# Patient Record
Sex: MALE | Race: OTHER | NOT HISPANIC OR LATINO
[De-identification: names, ages, dates, MRNs, and addresses within clinical notes are randomized per-mention and may not be internally consistent; named-entity substitution may affect disease eponyms.]

---

## 2017-10-16 PROBLEM — Z00.00 ENCOUNTER FOR PREVENTIVE HEALTH EXAMINATION: Status: ACTIVE | Noted: 2017-10-16

## 2017-11-09 ENCOUNTER — APPOINTMENT (OUTPATIENT)
Dept: DERMATOLOGY | Facility: CLINIC | Age: 82
End: 2017-11-09

## 2022-02-21 ENCOUNTER — INPATIENT (INPATIENT)
Facility: HOSPITAL | Age: 87
LOS: 4 days | Discharge: HOME | End: 2022-02-26
Attending: NEUROLOGICAL SURGERY | Admitting: NEUROLOGICAL SURGERY
Payer: MEDICARE

## 2022-02-21 VITALS
SYSTOLIC BLOOD PRESSURE: 173 MMHG | DIASTOLIC BLOOD PRESSURE: 98 MMHG | HEART RATE: 89 BPM | TEMPERATURE: 97 F | OXYGEN SATURATION: 100 % | RESPIRATION RATE: 17 BRPM

## 2022-02-21 DIAGNOSIS — S06.5X9A TRAUMATIC SUBDURAL HEMORRHAGE WITH LOSS OF CONSCIOUSNESS OF UNSPECIFIED DURATION, INITIAL ENCOUNTER: ICD-10-CM

## 2022-02-21 LAB
ALBUMIN SERPL ELPH-MCNC: 3.9 G/DL — SIGNIFICANT CHANGE UP (ref 3.3–5)
ALP SERPL-CCNC: 73 U/L — SIGNIFICANT CHANGE UP (ref 40–120)
ALT FLD-CCNC: 10 U/L — SIGNIFICANT CHANGE UP (ref 4–41)
ANION GAP SERPL CALC-SCNC: 10 MMOL/L — SIGNIFICANT CHANGE UP (ref 7–14)
APTT BLD: 29.8 SEC — SIGNIFICANT CHANGE UP (ref 27–36.3)
AST SERPL-CCNC: 11 U/L — SIGNIFICANT CHANGE UP (ref 4–40)
BASOPHILS # BLD AUTO: 0.07 K/UL — SIGNIFICANT CHANGE UP (ref 0–0.2)
BASOPHILS NFR BLD AUTO: 0.9 % — SIGNIFICANT CHANGE UP (ref 0–2)
BILIRUB SERPL-MCNC: 0.2 MG/DL — SIGNIFICANT CHANGE UP (ref 0.2–1.2)
BUN SERPL-MCNC: 33 MG/DL — HIGH (ref 7–23)
CALCIUM SERPL-MCNC: 9 MG/DL — SIGNIFICANT CHANGE UP (ref 8.4–10.5)
CHLORIDE SERPL-SCNC: 108 MMOL/L — HIGH (ref 98–107)
CO2 SERPL-SCNC: 23 MMOL/L — SIGNIFICANT CHANGE UP (ref 22–31)
CREAT SERPL-MCNC: 2.04 MG/DL — HIGH (ref 0.5–1.3)
EOSINOPHIL # BLD AUTO: 0.26 K/UL — SIGNIFICANT CHANGE UP (ref 0–0.5)
EOSINOPHIL NFR BLD AUTO: 3.5 % — SIGNIFICANT CHANGE UP (ref 0–6)
GLUCOSE SERPL-MCNC: 107 MG/DL — HIGH (ref 70–99)
HCT VFR BLD CALC: 36.4 % — LOW (ref 39–50)
HGB BLD-MCNC: 12.2 G/DL — LOW (ref 13–17)
IANC: 3.51 K/UL — SIGNIFICANT CHANGE UP (ref 1.5–8.5)
IMM GRANULOCYTES NFR BLD AUTO: 0.3 % — SIGNIFICANT CHANGE UP (ref 0–1.5)
INR BLD: 1.06 RATIO — SIGNIFICANT CHANGE UP (ref 0.88–1.16)
LYMPHOCYTES # BLD AUTO: 2.77 K/UL — SIGNIFICANT CHANGE UP (ref 1–3.3)
LYMPHOCYTES # BLD AUTO: 37.3 % — SIGNIFICANT CHANGE UP (ref 13–44)
MAGNESIUM SERPL-MCNC: 2 MG/DL — SIGNIFICANT CHANGE UP (ref 1.6–2.6)
MCHC RBC-ENTMCNC: 31.7 PG — SIGNIFICANT CHANGE UP (ref 27–34)
MCHC RBC-ENTMCNC: 33.5 GM/DL — SIGNIFICANT CHANGE UP (ref 32–36)
MCV RBC AUTO: 94.5 FL — SIGNIFICANT CHANGE UP (ref 80–100)
MONOCYTES # BLD AUTO: 0.8 K/UL — SIGNIFICANT CHANGE UP (ref 0–0.9)
MONOCYTES NFR BLD AUTO: 10.8 % — SIGNIFICANT CHANGE UP (ref 2–14)
NEUTROPHILS # BLD AUTO: 3.51 K/UL — SIGNIFICANT CHANGE UP (ref 1.8–7.4)
NEUTROPHILS NFR BLD AUTO: 47.2 % — SIGNIFICANT CHANGE UP (ref 43–77)
NRBC # BLD: 0 /100 WBCS — SIGNIFICANT CHANGE UP
NRBC # FLD: 0 K/UL — SIGNIFICANT CHANGE UP
PHOSPHATE SERPL-MCNC: 3 MG/DL — SIGNIFICANT CHANGE UP (ref 2.5–4.5)
PLATELET # BLD AUTO: 233 K/UL — SIGNIFICANT CHANGE UP (ref 150–400)
POTASSIUM SERPL-MCNC: 4.8 MMOL/L — SIGNIFICANT CHANGE UP (ref 3.5–5.3)
POTASSIUM SERPL-SCNC: 4.8 MMOL/L — SIGNIFICANT CHANGE UP (ref 3.5–5.3)
PROT SERPL-MCNC: 6.6 G/DL — SIGNIFICANT CHANGE UP (ref 6–8.3)
PROTHROM AB SERPL-ACNC: 12.2 SEC — SIGNIFICANT CHANGE UP (ref 10.6–13.6)
RBC # BLD: 3.85 M/UL — LOW (ref 4.2–5.8)
RBC # FLD: 13 % — SIGNIFICANT CHANGE UP (ref 10.3–14.5)
SODIUM SERPL-SCNC: 141 MMOL/L — SIGNIFICANT CHANGE UP (ref 135–145)
TROPONIN T, HIGH SENSITIVITY RESULT: 14 NG/L — SIGNIFICANT CHANGE UP
WBC # BLD: 7.43 K/UL — SIGNIFICANT CHANGE UP (ref 3.8–10.5)
WBC # FLD AUTO: 7.43 K/UL — SIGNIFICANT CHANGE UP (ref 3.8–10.5)

## 2022-02-21 PROCEDURE — 99285 EMERGENCY DEPT VISIT HI MDM: CPT

## 2022-02-21 PROCEDURE — 70450 CT HEAD/BRAIN W/O DYE: CPT | Mod: 26,MA

## 2022-02-21 NOTE — ED PROVIDER NOTE - ATTENDING CONTRIBUTION TO CARE
86M fell face first 1 month ago, was seen at OSH, CT was neg afterwards, then having headache, MRI was done today.   MRI neck chronic findings.  MRI head - Mod large L fronto parietal SDH found 2.4 cm with 0.4cm of shift.  Normal neuro exam at present.  Pt c/o mild HA.  Plan check labs, CTH.  Nsx eval.  Admit.  VS:  unremarkable    GEN - NAD;   well appearing;   A+O x3   HEAD - NC/AT     ENT - PEERL, EOMI, mucous membranes    moist , no discharge      NECK: Neck supple, non-tender without lymphadenopathy, no masses, no JVD  PULM - CTA b/l,  symmetric breath sounds  COR -  normal heart sounds    ABD - , ND, NT, soft,  BACK - no CVA tenderness, nontender spine     EXTREMS - no edema, no deformity, warm and well perfused    SKIN - no rash    or bruising      NEUROLOGIC - alert, face symmetric, speech fluent, sensation nl, motor no focal deficit.

## 2022-02-21 NOTE — ED PROVIDER NOTE - CLINICAL SUMMARY MEDICAL DECISION MAKING FREE TEXT BOX
87 yo male with a PMHx of HTN, HLD, Stage 0 prostate cancer, BCC, SCC presenting from outpatient neurologist Dr. Morgan for SDH on MR brain s/p fall w/ head trauma 1 month prior. NSx consulted. Patient w/o neurological deficits. Check CT head. MRI to be uploaded to PACS once we have someone to deliver CD>

## 2022-02-21 NOTE — ED ADULT TRIAGE NOTE - CHIEF COMPLAINT QUOTE
Pt sent from out pt MRI after imaging showed clot in brain. Pt c/o neck pain. Pt states he had fall in January and MRI was follow up. PMH HTN, Squamous cell CA, Prostate CA

## 2022-02-21 NOTE — H&P ADULT - HISTORY OF PRESENT ILLNESS
86M pmhx htn, hld, prostate Ca, BCC, SCC follows with Oncologist at [facility] Dr. Dobbins per daughter was last seen by oncologist last year and is in remission and was advised to f/u in 1 year. Patient fell 1 month ago onto concrete was taken to [facility] at that time and dc home. He has been having occipital Ha x 3 weeks and an oupt mri head and C/s  was done by neurologist Dr. Clayton today. The mri showed a large subacute on chronic SDH left side with shift and patient was advised to come to ED. Patient denies any weakness, nausea or vomiting reports Ashraf is constant 7/10.

## 2022-02-21 NOTE — CHART NOTE - NSCHARTNOTEFT_GEN_A_CORE
IMPROVE VTE Individual Risk Assessment    RISK                                                                Points    [  ] Previous VTE                                                  3    [  ] Thrombophilia                                               2    [  ] Lower limb paralysis                                      2        (unable to hold up >15 seconds)      [ x ] Current Cancer                                              2         (within 6 months)    [  ] Immobilization > 24 hrs                                1    [x  ] ICU/CCU stay > 24 hours                              1    [ x ] Age > 60                                                      1    IMPROVE VTE Score _4________    IMPROVE Score 0-1: Low Risk, No VTE prophylaxis required for most patients, encourage ambulation.   IMPROVE Score 2-3: At risk, pharmacologic VTE prophylaxis is indicated for most patients (in the absence of a contraindication)  IMPROVE Score > or = 4: High Risk, pharmacologic VTE prophylaxis is indicated for most patients (in the absence of a contraindication)

## 2022-02-21 NOTE — H&P ADULT - ASSESSMENT
86M with Left SDH 2.4cm with 0.4cm shift to right.    P:  - admit tos icu q1h neuro cehcks  - keppra for seizure ppx  - preop for OR Wednesday  - Rpt CTH stat if acute change in neuro exam  - medical clearance for OR    d/w attending

## 2022-02-21 NOTE — ED PROVIDER NOTE - NS ED ROS FT
REVIEW OF SYSTEMS:    CONSTITUTIONAL: No weakness, fevers or chills  EYES/ENT: No visual changes;  No vertigo or throat pain, +occipital headache and neck pain  NECK: No pain or stiffness  RESPIRATORY: No cough, wheezing, hemoptysis; No shortness of breath  CARDIOVASCULAR: No chest pain or palpitations  GASTROINTESTINAL: No abdominal or epigastric pain. No nausea, vomiting, or hematemesis; No diarrhea or constipation. No melena or hematochezia.  GENITOURINARY: No dysuria, frequency or hematuria  NEUROLOGICAL: No numbness or weakness  SKIN: No itching, burning, rashes, or lesions   All other review of systems is negative unless indicated above.

## 2022-02-21 NOTE — ED PROVIDER NOTE - PHYSICAL EXAMINATION
Vital Signs Last 24 Hrs  T(C): 36.1 (21 Feb 2022 20:02), Max: 36.1 (21 Feb 2022 20:02)  T(F): 97 (21 Feb 2022 20:02), Max: 97 (21 Feb 2022 20:02)  HR: 89 (21 Feb 2022 20:02) (89 - 89)  BP: 173/98 (21 Feb 2022 20:02) (173/98 - 173/98)  BP(mean): --  RR: 17 (21 Feb 2022 20:02) (17 - 17)  SpO2: 100% (21 Feb 2022 20:02) (100% - 100%)    General: Elderly male in no acute distress   HEENT: NCAT.  Right pupil pinpt, sluggishly reactive, left surgical pupoil, non reactive  EOMI.  No scleral icterus or injection.  Moist MM.  No oropharyngeal exudates.    Neck: Supple.  Full ROM.  No JVD.  No thyromegaly. No lymphadenopathy.   Heart: RRR.  Normal S1 and S2.  No murmurs, rubs, or gallops.   Lungs: CTAB. No wheezes, crackles, or rhonchi.    Abdomen: BS+, soft, NT/ND.  No organomegaly.  Skin: Warm and dry.  No rashes.  Extremities: No edema, clubbing, or cyanosis.  2+ peripheral pulses b/l.  Musculoskeletal: No deformities.  No spinal or paraspinal tenderness.  Neuro: A&Ox3.  CN II-XII intact.  5/5 strength in UE and LE b/l.  Tactile sensation intact in UE and LE b/l.

## 2022-02-21 NOTE — ED ADULT NURSE NOTE - OBJECTIVE STATEMENT
patient arrives to the ER A&Ox3, ambulatory c/o outpatient MRI being + for clot in the brain. patient states that he had a fall in January that led to neck pain and got an outpatient MRI to follow up on neck pain. MRI showed a clot in the brain and patient was advised to come to the ER. patient denies any new neuro deficits, and no neuro deficits noted on assessment. 18G IV placed in R forearm, labs sent. breathing even and unlabored, pallor/diaphoresis not noted. patient pending results at this time.

## 2022-02-21 NOTE — ED PROVIDER NOTE - OBJECTIVE STATEMENT
85 yo male with a PMHx of HTN, HLD, Stage 0 prostate cancer, BCC, SCC presenting from outpatient neurologist Dr. Morgan for abn MRI finding. Patient had a fall onto concrete in January, fell face forward w/ anterior face trauma. Workup at [facility] was negative at that time including CT head. Patient has seen been having occipital headache and neck pain x 2-3 weeks and an MRI was recommended. MRI head and neck obtained 2/21 revealing for mod-large subacute vs chronic left frontal parietal and lesser extent left temporal occipital SDH 2.4 cm, compression of left frontal parietal lobes, mild compression left lateral ventricle, 0.4 cm left subfalcine shift. Patient denies visual changes, n/v, seizure like activity, or any neurological weakness. Is not on blood thinners. Daughter at bedside with MRI result and disc.

## 2022-02-22 ENCOUNTER — TRANSCRIPTION ENCOUNTER (OUTPATIENT)
Age: 87
End: 2022-02-22

## 2022-02-22 LAB
ANION GAP SERPL CALC-SCNC: 6 MMOL/L — LOW (ref 7–14)
APTT BLD: 29.8 SEC — SIGNIFICANT CHANGE UP (ref 27–36.3)
BLD GP AB SCN SERPL QL: NEGATIVE — SIGNIFICANT CHANGE UP
BUN SERPL-MCNC: 32 MG/DL — HIGH (ref 7–23)
CALCIUM SERPL-MCNC: 8.7 MG/DL — SIGNIFICANT CHANGE UP (ref 8.4–10.5)
CHLORIDE SERPL-SCNC: 106 MMOL/L — SIGNIFICANT CHANGE UP (ref 98–107)
CO2 SERPL-SCNC: 23 MMOL/L — SIGNIFICANT CHANGE UP (ref 22–31)
CREAT SERPL-MCNC: 2.05 MG/DL — HIGH (ref 0.5–1.3)
GLUCOSE SERPL-MCNC: 106 MG/DL — HIGH (ref 70–99)
HCT VFR BLD CALC: 36.3 % — LOW (ref 39–50)
HGB BLD-MCNC: 12.2 G/DL — LOW (ref 13–17)
INR BLD: 1.05 RATIO — SIGNIFICANT CHANGE UP (ref 0.88–1.16)
MAGNESIUM SERPL-MCNC: 2 MG/DL — SIGNIFICANT CHANGE UP (ref 1.6–2.6)
MCHC RBC-ENTMCNC: 32 PG — SIGNIFICANT CHANGE UP (ref 27–34)
MCHC RBC-ENTMCNC: 33.6 GM/DL — SIGNIFICANT CHANGE UP (ref 32–36)
MCV RBC AUTO: 95.3 FL — SIGNIFICANT CHANGE UP (ref 80–100)
NRBC # BLD: 0 /100 WBCS — SIGNIFICANT CHANGE UP
NRBC # FLD: 0 K/UL — SIGNIFICANT CHANGE UP
PHOSPHATE SERPL-MCNC: 3.1 MG/DL — SIGNIFICANT CHANGE UP (ref 2.5–4.5)
PLATELET # BLD AUTO: 225 K/UL — SIGNIFICANT CHANGE UP (ref 150–400)
POTASSIUM SERPL-MCNC: 4.8 MMOL/L — SIGNIFICANT CHANGE UP (ref 3.5–5.3)
POTASSIUM SERPL-SCNC: 4.8 MMOL/L — SIGNIFICANT CHANGE UP (ref 3.5–5.3)
PROTHROM AB SERPL-ACNC: 12.1 SEC — SIGNIFICANT CHANGE UP (ref 10.6–13.6)
RBC # BLD: 3.81 M/UL — LOW (ref 4.2–5.8)
RBC # FLD: 13 % — SIGNIFICANT CHANGE UP (ref 10.3–14.5)
RH IG SCN BLD-IMP: POSITIVE — SIGNIFICANT CHANGE UP
RH IG SCN BLD-IMP: POSITIVE — SIGNIFICANT CHANGE UP
SARS-COV-2 RNA SPEC QL NAA+PROBE: SIGNIFICANT CHANGE UP
SODIUM SERPL-SCNC: 135 MMOL/L — SIGNIFICANT CHANGE UP (ref 135–145)
WBC # BLD: 8.33 K/UL — SIGNIFICANT CHANGE UP (ref 3.8–10.5)
WBC # FLD AUTO: 8.33 K/UL — SIGNIFICANT CHANGE UP (ref 3.8–10.5)

## 2022-02-22 PROCEDURE — 77011 CT SCAN FOR LOCALIZATION: CPT | Mod: 26

## 2022-02-22 PROCEDURE — 93010 ELECTROCARDIOGRAM REPORT: CPT | Mod: 77

## 2022-02-22 PROCEDURE — 71045 X-RAY EXAM CHEST 1 VIEW: CPT | Mod: 26

## 2022-02-22 PROCEDURE — 99222 1ST HOSP IP/OBS MODERATE 55: CPT

## 2022-02-22 PROCEDURE — 93306 TTE W/DOPPLER COMPLETE: CPT | Mod: 26

## 2022-02-22 PROCEDURE — 99291 CRITICAL CARE FIRST HOUR: CPT

## 2022-02-22 RX ORDER — TAMSULOSIN HYDROCHLORIDE 0.4 MG/1
1 CAPSULE ORAL
Qty: 0 | Refills: 0 | DISCHARGE

## 2022-02-22 RX ORDER — METOPROLOL TARTRATE 50 MG
5 TABLET ORAL ONCE
Refills: 0 | Status: COMPLETED | OUTPATIENT
Start: 2022-02-22 | End: 2022-02-22

## 2022-02-22 RX ORDER — AMLODIPINE BESYLATE 2.5 MG/1
5 TABLET ORAL DAILY
Refills: 0 | Status: DISCONTINUED | OUTPATIENT
Start: 2022-02-22 | End: 2022-02-26

## 2022-02-22 RX ORDER — SODIUM CHLORIDE 9 MG/ML
1000 INJECTION INTRAMUSCULAR; INTRAVENOUS; SUBCUTANEOUS
Refills: 0 | Status: DISCONTINUED | OUTPATIENT
Start: 2022-02-22 | End: 2022-02-23

## 2022-02-22 RX ORDER — SIMVASTATIN 20 MG/1
20 TABLET, FILM COATED ORAL AT BEDTIME
Refills: 0 | Status: DISCONTINUED | OUTPATIENT
Start: 2022-02-22 | End: 2022-02-26

## 2022-02-22 RX ORDER — AMLODIPINE BESYLATE 2.5 MG/1
5 TABLET ORAL DAILY
Refills: 0 | Status: DISCONTINUED | OUTPATIENT
Start: 2022-02-22 | End: 2022-02-22

## 2022-02-22 RX ORDER — TAMSULOSIN HYDROCHLORIDE 0.4 MG/1
0.4 CAPSULE ORAL AT BEDTIME
Refills: 0 | Status: DISCONTINUED | OUTPATIENT
Start: 2022-02-22 | End: 2022-02-22

## 2022-02-22 RX ORDER — LEVETIRACETAM 250 MG/1
500 TABLET, FILM COATED ORAL EVERY 12 HOURS
Refills: 0 | Status: DISCONTINUED | OUTPATIENT
Start: 2022-02-22 | End: 2022-02-24

## 2022-02-22 RX ORDER — SIMVASTATIN 20 MG/1
1 TABLET, FILM COATED ORAL
Qty: 0 | Refills: 0 | DISCHARGE

## 2022-02-22 RX ORDER — METOPROLOL TARTRATE 50 MG
5 TABLET ORAL EVERY 6 HOURS
Refills: 0 | Status: DISCONTINUED | OUTPATIENT
Start: 2022-02-22 | End: 2022-02-24

## 2022-02-22 RX ORDER — TAMSULOSIN HYDROCHLORIDE 0.4 MG/1
0.4 CAPSULE ORAL AT BEDTIME
Refills: 0 | Status: DISCONTINUED | OUTPATIENT
Start: 2022-02-22 | End: 2022-02-26

## 2022-02-22 RX ORDER — AMLODIPINE BESYLATE 2.5 MG/1
1 TABLET ORAL
Qty: 0 | Refills: 0 | DISCHARGE

## 2022-02-22 RX ORDER — SIMVASTATIN 20 MG/1
20 TABLET, FILM COATED ORAL AT BEDTIME
Refills: 0 | Status: DISCONTINUED | OUTPATIENT
Start: 2022-02-22 | End: 2022-02-22

## 2022-02-22 RX ORDER — SODIUM CHLORIDE 9 MG/ML
1 INJECTION INTRAMUSCULAR; INTRAVENOUS; SUBCUTANEOUS
Refills: 0 | Status: DISCONTINUED | OUTPATIENT
Start: 2022-02-22 | End: 2022-02-26

## 2022-02-22 RX ADMIN — Medication 5 MILLIGRAM(S): at 01:32

## 2022-02-22 RX ADMIN — Medication 5 MILLIGRAM(S): at 06:02

## 2022-02-22 RX ADMIN — Medication 5 MILLIGRAM(S): at 12:07

## 2022-02-22 RX ADMIN — SIMVASTATIN 20 MILLIGRAM(S): 20 TABLET, FILM COATED ORAL at 22:21

## 2022-02-22 RX ADMIN — LEVETIRACETAM 400 MILLIGRAM(S): 250 TABLET, FILM COATED ORAL at 17:55

## 2022-02-22 RX ADMIN — SODIUM CHLORIDE 125 MILLILITER(S): 9 INJECTION INTRAMUSCULAR; INTRAVENOUS; SUBCUTANEOUS at 22:21

## 2022-02-22 RX ADMIN — LEVETIRACETAM 400 MILLIGRAM(S): 250 TABLET, FILM COATED ORAL at 06:02

## 2022-02-22 RX ADMIN — SODIUM CHLORIDE 125 MILLILITER(S): 9 INJECTION INTRAMUSCULAR; INTRAVENOUS; SUBCUTANEOUS at 01:32

## 2022-02-22 RX ADMIN — Medication 5 MILLIGRAM(S): at 17:55

## 2022-02-22 RX ADMIN — TAMSULOSIN HYDROCHLORIDE 0.4 MILLIGRAM(S): 0.4 CAPSULE ORAL at 22:22

## 2022-02-22 RX ADMIN — Medication 5 MILLIGRAM(S): at 23:42

## 2022-02-22 RX ADMIN — AMLODIPINE BESYLATE 5 MILLIGRAM(S): 2.5 TABLET ORAL at 10:04

## 2022-02-22 RX ADMIN — SODIUM CHLORIDE 1 GRAM(S): 9 INJECTION INTRAMUSCULAR; INTRAVENOUS; SUBCUTANEOUS at 17:55

## 2022-02-22 NOTE — CONSULT NOTE ADULT - ATTENDING COMMENTS
Left subdural hemorrhage  a.  Admit to SICU  b.  NVS  Q1  c.   Hold DVT prophylaxis  d.   Operative plan per NSX    Hypertension  a.  Restart home medications  b.  Maintain MAP<90    At risk for malnutrition  a.  Keep NPO  b.  IV NSS

## 2022-02-22 NOTE — PATIENT PROFILE ADULT - PRO INTERPRETER NEED 2
General/GI: remains on nocturnal tube feeds with marginal appetite at home but denies nausea/vomiting  Neuro: gait disturbance unchanged, still somewhat unsteady gait but denies falls PROVIDER:[TOKEN:[[id number]]] English

## 2022-02-22 NOTE — CONSULT NOTE ADULT - ASSESSMENT
85yo M with Hx HTN, HLD (NOT on AC) who presents with HA after fall 1 mo prior with imaging findings of 2.4cm L SDH with associated 0.4cm L subfalcine shift. SICU consulted for q1h neuro checks.     PLAN:  Neuro: L SDH  - q1h neuro checks  - Keppra for seizure ppx  - NSGY planning for OR Wednesday  - Repeat CTH STAT if acute change in neuro exam    Respiratory: no active issues   - Monitor respiratory status    Cardiovascular: h/o HTN  - SBP goal < 160 -- start Clevaprex gtt if not controlled  - Restart home meds: amlodipine   - Patient does not know dose of home losartan -- f/u with PCP in AM    Gastrointestinal: no active issues   - Diet: NPO except meds    Genitourinary/Renal: no active issues   - IVF: NS @ 125  - Monitor UOP  - Trend electrolytes on BMP qD, replete PRN    Heme: SDH  - Trend H/H on CBC qD  - VTE ppx: HELD iso SDH    ID: no active issues   - Trend WBC on CBC qD  - Monitor fever curve    Endocrine: no active issues   - Trend FS on BMP    Lines:   - PIV x 2    Code Status: Full Code  Dispo: ELSIE Pittman, PGY-2  Surgical ICU  #[id number] | #[id number]  87yo M with Hx HTN, HLD (NOT on AC) who presents with HA after fall 1 mo prior with imaging findings of 2.4cm L SDH with associated 0.4cm L subfalcine shift. SICU consulted for q1h neuro checks.     PLAN:  Neuro: L SDH  - q1h neuro checks  - Keppra for seizure ppx  - NSGY planning for OR Wednesday  - Repeat CTH STAT if acute change in neuro exam  - F/u CTH 2/21 final read    Respiratory: no active issues   - Monitor respiratory status    Cardiovascular: h/o HTN  - SBP goal < 160 -- start Clevaprex gtt if not controlled  - Restart home meds: amlodipine   - Patient does not know dose of home losartan -- f/u with PCP in AM    Gastrointestinal: no active issues   - Diet: NPO except meds    Genitourinary/Renal: no active issues   - IVF: NS @ 125  - Monitor UOP  - Trend electrolytes on BMP qD, replete PRN    Heme: SDH  - Trend H/H on CBC qD  - VTE ppx: HELD iso SDH    ID: no active issues   - Trend WBC on CBC qD  - Monitor fever curve    Endocrine: no active issues   - Trend FS on BMP    Lines:   - PIV x 2    Code Status: Full Code  Dispo: ELSIE Pittman, PGY-2  Surgical ICU  #[id number] | #[id number]  85yo M with Hx HTN, HLD (NOT on AC) who presents with HA after fall 1 mo prior with imaging findings of 2.4cm L SDH with associated 0.4cm L subfalcine shift. SICU consulted for q1h neuro checks.     PLAN:  Neuro: L SDH  - q1h neuro checks  - Keppra for seizure ppx  - NSGY planning for OR Wednesday  - Repeat CTH STAT if acute change in neuro exam  - F/u CTH 2/21 final read    Respiratory: no active issues   - Monitor respiratory status    Cardiovascular: h/o HTN  - SBP goal < 160 -- start Clevaprex gtt if not controlled  - Restart home meds: amlodipine   - Patient does not know dose of home losartan -- f/u with PCP in AM    Gastrointestinal: no active issues   - Diet: NPO except meds    Genitourinary/Renal: h/o prostate CA, in remission  - IVF: NS @ 125  - Monitor UOP  - Trend electrolytes on BMP qD, replete PRN  - C/w home tamsulosin    Heme: SDH  - Trend H/H on CBC qD  - VTE ppx: HELD iso SDH    ID: no active issues   - Trend WBC on CBC qD  - Monitor fever curve    Endocrine: no active issues   - Trend FS on BMP    Lines:   - PIV x 2    Code Status: Full Code  Dispo: ELSIE Pittman, PGY-2  Surgical ICU  #[id number] | #[id number]  87yo M with Hx HTN, HLD (NOT on AC) who presents with HA after fall 1 mo prior with imaging findings of 2.4cm L SDH with associated 0.4cm L subfalcine shift. SICU consulted for q1h neuro checks.     PLAN:  Neuro: L SDH  - q1h neuro checks  - Keppra for seizure ppx  - NSGY planning for OR Wednesday  - Repeat CTH STAT if acute change in neuro exam  - F/u CTH 2/21 final read    Respiratory: no active issues   - Monitor respiratory status    Cardiovascular: h/o HTN, HLD  - SBP goal < 160 -- start Clevaprex gtt if not controlled  - Restart home meds: amlodipine, simvastatin  - Patient does not know dose of home losartan -- f/u with PCP in AM    Gastrointestinal: no active issues   - Diet: NPO except meds    Genitourinary/Renal: h/o prostate CA, in remission  - IVF: NS @ 125  - Monitor UOP  - Trend electrolytes on BMP qD, replete PRN  - C/w home tamsulosin    Heme: SDH  - Trend H/H on CBC qD  - VTE ppx: HELD iso SDH    ID: no active issues   - Trend WBC on CBC qD  - Monitor fever curve    Endocrine: no active issues   - Trend FS on BMP    Lines:   - PIV x 2    Code Status: Full Code  Dispo: ELSIE Pittman, PGY-2  Surgical ICU  #[id number] | #[id number]  87yo M with Hx HTN, HLD (NOT on AC) who presents with HA after fall 1 mo prior with imaging findings of 2.4cm L SDH with associated 0.4cm L subfalcine shift. SICU consulted for q1h neuro checks.     PLAN:  Neuro: L SDH  - q1h neuro checks  - Keppra for seizure ppx  - NSGY planning for OR Wednesday  - Repeat CTH STAT if acute change in neuro exam  - F/u CTH 2/21 final read    Respiratory: no active issues   - Monitor respiratory status    Cardiovascular: h/o HTN, HLD  - SBP goal < 160 -- start Clevaprex gtt if not controlled  - Restart home meds: amlodipine, simvastatin  - Patient does not know dose of home losartan -- f/u with PCP in AM    Gastrointestinal: no active issues   - Diet: NPO except meds    Genitourinary/Renal: h/o prostate CA, in remission  - IVF: NS @ 125  - Monitor UOP  - Trend electrolytes on BMP qD, replete PRN  - C/w home tamsulosin    Heme: SDH  - Trend H/H on CBC qD  - VTE ppx: chemical ppx HELD iso SDH; SCDs for mechanical ppx    ID: no active issues   - Trend WBC on CBC qD  - Monitor fever curve    Endocrine: no active issues   - Trend FS on BMP    Lines:   - PIV x 2    Code Status: Full Code  Dispo: ELSIE Pittman, PGY-2  Surgical ICU  #[id number] | #[id number]  85yo M with Hx HTN, HLD (NOT on AC) who presents with HA after fall 1 mo prior with imaging findings of 2.4cm L SDH with associated 0.4cm L subfalcine shift. SICU consulted for q1h neuro checks.     PLAN:  Neuro: L SDH  - q1h neuro checks  - Keppra for seizure ppx  - Pain control PRN  - NSGY planning for OR Wednesday  - Repeat CTH STAT if acute change in neuro exam  - F/u CTH 2/21 final read    Respiratory: no active issues   - Monitor respiratory status    Cardiovascular: h/o HTN, HLD  - SBP goal < 160 -- start Clevaprex gtt if not controlled  - Restart home meds: amlodipine, simvastatin  - Patient does not know dose of home losartan -- f/u with PCP in AM    Gastrointestinal: no active issues   - Diet: NPO except meds    Genitourinary/Renal: h/o prostate CA, in remission  - IVF: NS @ 125  - Monitor UOP  - Trend electrolytes on BMP qD, replete PRN  - C/w home tamsulosin    Heme: SDH  - Trend H/H on CBC qD  - VTE ppx: chemical ppx HELD iso SDH; SCDs for mechanical ppx    ID: no active issues   - Trend WBC on CBC qD  - Monitor fever curve    Endocrine: no active issues   - Trend FS on BMP    Lines:   - PIV x 2    Code Status: Full Code  Dispo: ELSIE Pittman, PGY-2  Surgical ICU  #[id number] | #[id number]  85yo M with Hx HTN, HLD (NOT on AC) who presents with HA after fall 1 mo prior with imaging findings of 2.4cm L SDH with associated 0.4cm L subfalcine shift. SICU consulted for q1h neuro checks.     PLAN:  Neuro: L SDH  - q1h neuro checks  - Keppra for seizure ppx  - Pain control PRN  - NSGY planning for OR Tuesday 2/22  - Repeat CTH STAT if acute change in neuro exam  - F/u CTH 2/21 final read    Respiratory: no active issues   - Monitor respiratory status    Cardiovascular: h/o HTN, HLD  - SBP goal < 160 -- start Clevaprex gtt if not controlled  - Home meds: amlodipine 5mg qD, simvastatin 20mg qD HELD while NPO  - Metoprolol IV 5mg q6h while NPO for BP control  - Patient does not know dose of home losartan -- f/u with PCP in AM    Gastrointestinal: no active issues   - Diet: NPO     Genitourinary/Renal: h/o prostate CA, in remission  - IVF: NS @ 125  - Monitor UOP  - Trend electrolytes on BMP qD, replete PRN  - C/w home tamsulosin    Heme: SDH  - Trend H/H on CBC qD  - VTE ppx: chemical ppx HELD iso SDH; SCDs for mechanical ppx    ID: no active issues   - Trend WBC on CBC qD  - Monitor fever curve    Endocrine: no active issues   - Trend FS on BMP    Lines:   - PIV x 2    Code Status: Full Code  Dispo: ELSIE Pittman, PGY-2  Surgical ICU  #[id number] | #[id number]  87yo M with Hx HTN, HLD (NOT on AC) who presents with HA after fall 1 mo prior with imaging findings of 2.4cm L SDH with associated 0.4cm L subfalcine shift. SICU consulted for q1h neuro checks.     PLAN:  Neuro: L SDH  - q1h neuro checks  - Keppra for seizure ppx  - Pain control PRN  - NSGY planning for OR Tuesday 2/22  - STAT stereotactic CTH for OR planning    Respiratory: no active issues   - Monitor respiratory status    Cardiovascular: h/o HTN, HLD  - SBP goal < 160 -- start Clevaprex gtt if not controlled  - Home meds: amlodipine 5mg qD, simvastatin 20mg qD HELD while NPO  - Metoprolol IV 5mg q6h while NPO for BP control  - Patient does not know dose of home losartan -- f/u with PCP in AM    Gastrointestinal: no active issues   - Diet: NPO     Genitourinary/Renal: h/o prostate CA, in remission  - IVF: NS @ 125  - Monitor UOP  - Trend electrolytes on BMP qD, replete PRN  - C/w home tamsulosin    Heme: SDH  - Trend H/H on CBC qD  - VTE ppx: chemical ppx HELD iso SDH; SCDs for mechanical ppx    ID: no active issues   - Trend WBC on CBC qD  - Monitor fever curve    Endocrine: no active issues   - Trend FS on BMP    Lines:   - PIV x 2    Code Status: Full Code  Dispo: ELSIE Pittman, PGY-2  Surgical ICU  #[id number] | #[id number]  87yo M with Hx HTN, HLD (NOT on AC) who presents with HA after fall 1 mo prior with imaging findings of 2.4cm L SDH with associated 0.4cm L subfalcine shift. SICU consulted for q1h neuro checks.     PLAN:  Neuro: L SDH  - q1h neuro checks  - Keppra for seizure ppx  - Pain control PRN  - NSGY planning for OR Tuesday 2/22  - STAT stereotactic CTH for OR planning    Respiratory: no active issues   - Monitor respiratory status    Cardiovascular: h/o HTN, HLD  - SBP goal < 160 -- start Clevaprex gtt if not controlled  - C/w simvastatin 20mg qD   - Home amlodipine 5mg qD + IV Metoprolol 5mg q6h for BP control  - Patient does not know dose of home losartan -- f/u with PCP in AM    Gastrointestinal: no active issues   - Diet: NPO     Genitourinary/Renal: h/o prostate CA, in remission  - IVF: NS @ 125  - Monitor UOP  - Trend electrolytes on BMP qD, replete PRN  - C/w home tamsulosin    Heme: SDH  - Trend H/H on CBC qD  - VTE ppx: chemical ppx HELD iso SDH; SCDs for mechanical ppx    ID: no active issues   - Trend WBC on CBC qD  - Monitor fever curve    Endocrine: no active issues   - Trend FS on BMP    Lines:   - PIV x 2    Code Status: Full Code  Dispo: ELSIE Pittman, PGY-2  Surgical ICU  #[id number] | #[id number]  85yo M with Hx HTN, HLD (NOT on AC) who presents with HA after fall 1 mo prior with imaging findings of 2.4cm L SDH with associated 0.4cm L subfalcine shift. SICU consulted for q1h neuro checks.     PLAN:  Neuro: L SDH  - q1h neuro checks  - Keppra for seizure ppx  - Pain control PRN  - NSGY planning for OR Tuesday 2/22  - STAT stereotactic CTH for OR planning    Respiratory: no active issues   - Monitor respiratory status    Cardiovascular: h/o HTN, HLD  - SBP goal < 160 -- start Clevaprex gtt if not controlled  - C/w simvastatin 20mg qD   - Home amlodipine 5mg qD + IV Metoprolol 5mg q6h for BP control  - Patient does not know dose of home losartan -- f/u with PCP in AM    Gastrointestinal: no active issues   - Diet: NPO except meds    Genitourinary/Renal: h/o prostate CA, in remission  - IVF: NS @ 125  - Monitor UOP  - Trend electrolytes on BMP qD, replete PRN  - C/w home tamsulosin    Heme: SDH  - Trend H/H on CBC qD  - VTE ppx: chemical ppx HELD iso SDH; SCDs for mechanical ppx    ID: no active issues   - Trend WBC on CBC qD  - Monitor fever curve    Endocrine: no active issues   - Trend FS on BMP    Lines:   - PIV x 2    Code Status: Full Code  Dispo: ELSIE Pittman, PGY-2  Surgical ICU  #[id number] | #[id number]

## 2022-02-22 NOTE — CONSULT NOTE ADULT - SUBJECTIVE AND OBJECTIVE BOX
HISTORY OF PRESENT ILLNESS:  85yo M with Hx HTN, HLD, prostate CA, BCC, and SCC who presented with HA and OP imaging demonstrating a L SDH. Patient had fallen in January with (+) HS. Imaging and workup at [facility] was negative for acute injury and intracranial pathology at that time. After discharge, patient continued to have L occipital and posterior neck pain and presented to an OP neurologist, Dr. Morgan, who scheduled him for an OP MRI. MRI on 2/21 demonstrated a 2.4cm L SDH extending through the frontal, parietal, temporal, and occipital regions with compression of L frontal and parietal lobes and L lateral ventricle and associated 0.4cm L subfalcine shift. Patient was sent to ED by Dr. Morgan for NSGY evaluation. SICU consulted for q1h neuro checks iso large L SDH with midline shift.     Patient denies visual changes, nausea/emesis, numbness, tingling, weakness, or seizure-like activity since onset of headaches. States that he is not on anticoagulation. He has history of bilateral cateract surgery and has changes in his pupils as a result; per patient and daughter, pupil shape is at baseline.     PAST MEDICAL HISTORY:   - HTN  - HLD  - prostate CA (in remission)  - BCC  - SCC    PAST SURGICAL HISTORY:   - bilateral cataract Sx  - R hernia repair 20yr ago in [place]    HOME MEDICATIONS:  - simvastatin 20mg qD  - tamsulosin 0.4mg qD  - amlodipine 5mg qD  - losartan ? unknown dosage    ALLERGIES:   - NKDA    PHYSICAL EXAM:    VITAL SIGNS:  ICU Vital Signs Last 24 Hrs  T(C): 36.4 (21 Feb 2022 23:08), Max: 36.4 (21 Feb 2022 23:08)  T(F): 97.6 (21 Feb 2022 23:08), Max: 97.6 (21 Feb 2022 23:08)  HR: 80 (21 Feb 2022 23:08) (80 - 89)  BP: 175/95 (21 Feb 2022 23:08) (173/98 - 175/95)  RR: 17 (21 Feb 2022 23:08) (17 - 17)  SpO2: 100% (21 Feb 2022 23:08) (100% - 100%)      NEURO:   Exam: AAOx4, CN II-XI intact without focal neural deficits, midline tongue protrusion, symmetrical smile, no pronator drift; L pupil with starburst pattern and R pupil 1mm -- both at baseline per patient + family    RESPIRATORY  Exam: no increased WOB on RA  Mechanical Ventilation: none    CARDIOVASCULAR  Exam: warm, well-perfused    GI/NUTRITION  Exam: abd soft, non-distended  Diet: NPO    GENITOURINARY/RENAL  Exam: no gross abnormalities    Labs:  141  |  108<H>  |  33<H>  ----------------------------<  107<H>  4.8   |  23  |  2.04<H>    Ca    9.0      21 Feb 2022 21:58  Phos  3.0     02-21  Mg     2.00     02-21    TPro  6.6  /  Alb  3.9  /  TBili  0.2  /  DBili  x   /  AST  11  /  ALT  10  /  AlkPhos  73  02-21    HEMATOLOGIC  [ ] VTE Prophylaxis: HELD iso SDH    Labs:             12.2   7.43  )-----------( 233      ( 21 Feb 2022 21:58 )             36.4     PT/INR - ( 21 Feb 2022 21:58 )   PT: 12.2 sec;   INR: 1.06 ratio    PTT - ( 21 Feb 2022 21:58 )  PTT:29.8 sec    Transfusion: [ ] PRBC	[ ] Platelets	[ ] FFP	[ ] Cryoprecipitate      INFECTIOUS DISEASES:  Medications: none  Recent Cultures: none      PATIENT CARE ACCESS DEVICES:  [2] Peripheral IV  [ ] Central Venous Line	[ ] R	[ ] L	[ ] IJ	[ ] Fem	[ ] SC	Placed:   [ ] Arterial Line		[ ] R	[ ] L	[ ] Fem	[ ] Rad	[ ] Ax	Placed:   [ ] PICC:					[ ] Mediport  [ ] Urinary Catheter, Date Placed:   [x] Necessity of urinary, arterial, and venous catheters discussed    OTHER MEDICATIONS:       IMAGING :    *** CTH READ PENDING ***  HISTORY OF PRESENT ILLNESS:  85yo M with Hx HTN, HLD, prostate CA, BCC, and SCC who presented with HA and OP imaging demonstrating a L SDH. Patient had fallen in January with (+) HS. Imaging and workup at [facility] was negative for acute injury and intracranial pathology at that time. After discharge, patient continued to have L occipital and posterior neck pain and presented to an OP neurologist, Dr. Morgan, who scheduled him for an OP MRI. MRI on 2/21 demonstrated a 2.4cm L SDH extending through the frontal, parietal, temporal, and occipital regions with compression of L frontal and parietal lobes and L lateral ventricle and associated 0.4cm L subfalcine shift. Patient was sent to ED by Dr. Morgan for NSGY evaluation. SICU consulted for q1h neuro checks iso large L SDH with midline shift.     Patient denies visual changes, nausea/emesis, numbness, tingling, weakness, or seizure-like activity since onset of headaches. States that he is not on anticoagulation. He has history of bilateral cateract surgery and has changes in his pupils as a result; per patient and daughter, pupil shape is at baseline.     PAST MEDICAL HISTORY:   - HTN  - HLD  - prostate CA (in remission)  - BCC  - SCC    PAST SURGICAL HISTORY:   - bilateral cataract Sx  - R hernia repair 20yr ago in [place]    HOME MEDICATIONS:  - simvastatin 20mg qD  - tamsulosin 0.4mg qD  - amlodipine 5mg qD  - losartan ? unknown dosage    ALLERGIES:   - NKDA    PHYSICAL EXAM:    VITAL SIGNS:  ICU Vital Signs Last 24 Hrs  T(C): 36.4 (21 Feb 2022 23:08), Max: 36.4 (21 Feb 2022 23:08)  T(F): 97.6 (21 Feb 2022 23:08), Max: 97.6 (21 Feb 2022 23:08)  HR: 80 (21 Feb 2022 23:08) (80 - 89)  BP: 175/95 (21 Feb 2022 23:08) (173/98 - 175/95)  RR: 17 (21 Feb 2022 23:08) (17 - 17)  SpO2: 100% (21 Feb 2022 23:08) (100% - 100%)      NEURO:   Exam: AAOx4, CN II-XI intact without focal neural deficits, midline tongue protrusion, symmetrical smile, no pronator drift; L pupil with starburst pattern and R pupil 1mm -- both at baseline per patient + family    RESPIRATORY  Exam: no increased WOB on RA  Mechanical Ventilation: none    CARDIOVASCULAR  Exam: warm, well-perfused    GI/NUTRITION  Exam: abd soft, non-distended  Diet: NPO    GENITOURINARY/RENAL  Exam: no gross abnormalities    Labs:  141  |  108<H>  |  33<H>  ----------------------------<  107<H>  4.8   |  23  |  2.04<H>    Ca    9.0      21 Feb 2022 21:58  Phos  3.0     02-21  Mg     2.00     02-21    TPro  6.6  /  Alb  3.9  /  TBili  0.2  /  DBili  x   /  AST  11  /  ALT  10  /  AlkPhos  73  02-21    HEMATOLOGIC  [ ] VTE Prophylaxis: HELD iso SDH    Labs:             12.2   7.43  )-----------( 233      ( 21 Feb 2022 21:58 )             36.4     PT/INR - ( 21 Feb 2022 21:58 )   PT: 12.2 sec;   INR: 1.06 ratio    PTT - ( 21 Feb 2022 21:58 )  PTT:29.8 sec    Transfusion: [ ] PRBC	[ ] Platelets	[ ] FFP	[ ] Cryoprecipitate      INFECTIOUS DISEASES:  Medications: none  Recent Cultures: none      PATIENT CARE ACCESS DEVICES:  [2] Peripheral IV  [ ] Central Venous Line	[ ] R	[ ] L	[ ] IJ	[ ] Fem	[ ] SC	Placed:   [ ] Arterial Line		[ ] R	[ ] L	[ ] Fem	[ ] Rad	[ ] Ax	Placed:   [ ] PICC:					[ ] Mediport  [ ] Urinary Catheter, Date Placed:   [x] Necessity of urinary, arterial, and venous catheters discussed    OTHER MEDICATIONS:       IMAGING :    < from: CT Head No Cont (02.21.22 @ 22:39) >  FINDINGS:    Mixed density left subdural hematoma measures a maximum depth of 2.6 cm   near the dome. Local mass effect on the adjacent parenchyma and left   lateral ventricle. Rightward 3 mm midline shift. No hydrocephalus or   basal cistern effacement.    Age-related cerebral volume loss and patchy white matter hypoattenuation   which is nonspecific in etiology but likely related to chronic   microvascular ischemic disease.    Left maxillary sinus partial opacification. Bilateral mastoid air cells   and middle ear cavities are clear.    Calvarium isintact.    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 Dr Leslie Shen was   informed by Dr. Machado with read back confirmation on 2/22/2022 at 12:18 AM.    Left maxillary sinus partial opacification consistent with fluid which   can be seen in the setting of sinusitis or blood product.    < end of copied text >  *** CTH READ PENDING ***  HISTORY OF PRESENT ILLNESS:  87yo M with Hx HTN, HLD, prostate CA, BCC, and SCC who presented with HA and OP imaging demonstrating a L SDH. Patient had fallen in January with (+) HS. Imaging and workup at [facility] was negative for acute injury and intracranial pathology at that time. After discharge, patient continued to have L occipital and posterior neck pain and presented to an OP neurologist, Dr. Morgan, who scheduled him for an OP MRI. MRI on 2/21 demonstrated a 2.4cm L SDH extending through the frontal, parietal, temporal, and occipital regions with compression of L frontal and parietal lobes and L lateral ventricle and associated 0.4cm L subfalcine shift. Patient was sent to ED by Dr. Morgan for NSGY evaluation. SICU consulted for q1h neuro checks iso large L SDH with midline shift.     Patient denies visual changes, nausea/emesis, numbness, tingling, weakness, or seizure-like activity since onset of headaches. States that he is not on anticoagulation. He has history of bilateral cateract surgery and has changes in his pupils as a result; per patient and daughter, pupil shape is at baseline.     PAST MEDICAL HISTORY:   - HTN  - HLD  - prostate CA (in remission)  - BCC  - SCC    PAST SURGICAL HISTORY:   - bilateral cataract Sx  - R hernia repair 20yr ago in [place]    HOME MEDICATIONS:  - simvastatin 20mg qD  - tamsulosin 0.4mg qD  - amlodipine 5mg qD  - losartan ? unknown dosage    ALLERGIES:   - NKDA    PHYSICAL EXAM:    VITAL SIGNS:  ICU Vital Signs Last 24 Hrs  T(C): 36.4 (21 Feb 2022 23:08), Max: 36.4 (21 Feb 2022 23:08)  T(F): 97.6 (21 Feb 2022 23:08), Max: 97.6 (21 Feb 2022 23:08)  HR: 80 (21 Feb 2022 23:08) (80 - 89)  BP: 175/95 (21 Feb 2022 23:08) (173/98 - 175/95)  RR: 17 (21 Feb 2022 23:08) (17 - 17)  SpO2: 100% (21 Feb 2022 23:08) (100% - 100%)      NEURO:   Exam: AAOx4, CN II-XI intact without focal neural deficits, midline tongue protrusion, symmetrical smile, no pronator drift; L pupil with starburst pattern and R pupil 1mm -- both at baseline per patient + family    RESPIRATORY  Exam: no increased WOB on RA  Mechanical Ventilation: none    CARDIOVASCULAR  Exam: warm, well-perfused    GI/NUTRITION  Exam: abd soft, non-distended  Diet: NPO    GENITOURINARY/RENAL  Exam: no gross abnormalities    Labs:  141  |  108<H>  |  33<H>  ----------------------------<  107<H>  4.8   |  23  |  2.04<H>    Ca    9.0      21 Feb 2022 21:58  Phos  3.0     02-21  Mg     2.00     02-21    TPro  6.6  /  Alb  3.9  /  TBili  0.2  /  DBili  x   /  AST  11  /  ALT  10  /  AlkPhos  73  02-21    HEMATOLOGIC  [ ] VTE Prophylaxis: HELD iso SDH    Labs:             12.2   7.43  )-----------( 233      ( 21 Feb 2022 21:58 )             36.4     PT/INR - ( 21 Feb 2022 21:58 )   PT: 12.2 sec;   INR: 1.06 ratio    PTT - ( 21 Feb 2022 21:58 )  PTT:29.8 sec    Transfusion: [ ] PRBC	[ ] Platelets	[ ] FFP	[ ] Cryoprecipitate      INFECTIOUS DISEASES:  Medications: none  Recent Cultures: none      PATIENT CARE ACCESS DEVICES:  [2] Peripheral IV  [ ] Central Venous Line	[ ] R	[ ] L	[ ] IJ	[ ] Fem	[ ] SC	Placed:   [ ] Arterial Line		[ ] R	[ ] L	[ ] Fem	[ ] Rad	[ ] Ax	Placed:   [ ] PICC:					[ ] Mediport  [ ] Urinary Catheter, Date Placed:   [x] Necessity of urinary, arterial, and venous catheters discussed    OTHER MEDICATIONS:       IMAGING :    < from: CT Head No Cont (02.21.22 @ 22:39) >  FINDINGS:    Mixed density left subdural hematoma measures a maximum depth of 2.6 cm   near the dome. Local mass effect on the adjacent parenchyma and left   lateral ventricle. Rightward 3 mm midline shift. No hydrocephalus or   basal cistern effacement.    Age-related cerebral volume loss and patchy white matter hypoattenuation   which is nonspecific in etiology but likely related to chronic   microvascular ischemic disease.    Left maxillary sinus partial opacification. Bilateral mastoid air cells   and middle ear cavities are clear.    Calvarium isintact.    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 Dr Leslie Shen was   informed by Dr. Machado with read back confirmation on 2/22/2022 at 12:18 AM.    Left maxillary sinus partial opacification consistent with fluid which   can be seen in the setting of sinusitis or blood product.    < end of copied text >

## 2022-02-22 NOTE — PROGRESS NOTE ADULT - SUBJECTIVE AND OBJECTIVE BOX
Surgery: burrhole evacuation of SDH  Consent: pending   Medical Clearance: pending     PAST 24HR EVENTS: no issues o/n     T(C): 35.8 (02-22-22 @ 00:30), Max: 36.8 (02-22-22 @ 00:19)  HR: 64 (02-22-22 @ 04:00) (63 - 89)  BP: 148/70 (02-22-22 @ 04:00) (148/70 - 183/97)  RR: 16 (02-22-22 @ 04:00) (16 - 20)  SpO2: 96% (02-22-22 @ 04:00) (94% - 100%)  Wt(kg): --  02-22    135  |  106  |  32<H>  ----------------------------<  106<H>  4.8   |  23  |  2.05<H>    Ca    8.7      22 Feb 2022 01:32  Phos  3.1     02-22  Mg     2.00     02-22    TPro  6.6  /  Alb  3.9  /  TBili  0.2  /  DBili  x   /  AST  11  /  ALT  10  /  AlkPhos  73  02-21                          12.2   8.33  )-----------( 225      ( 22 Feb 2022 01:32 )             36.3     PT/INR - ( 22 Feb 2022 01:32 )   PT: 12.1 sec;   INR: 1.05 ratio         PTT - ( 22 Feb 2022 01:32 )  PTT:29.8 sec  Type & Screen (in past 72hrs): active  Covid-neg  stereo CTH-D     awake, a&Ox3, fc  b/l uneven pupils  LEMUS 5/5  no drift    P:  - keep npo  - preop for OR today  - obtain medical clearance this am.    d/w attending

## 2022-02-22 NOTE — PROGRESS NOTE ADULT - ASSESSMENT
· Assessment	  87yo M with Hx HTN, HLD (NOT on AC) who presents with HA after fall 1 mo prior with imaging findings of 2.4cm L SDH with associated 0.4cm L subfalcine shift. SICU consulted for q1h neuro checks.     Interval events:  -taken to OR by NSGY    PLAN:  Neuro: L SDH  - q1h neuro checks  - Keppra for seizure ppx  - Pain control PRN  - NSGY planning for OR Tuesday 2/22  - STAT stereotactic CTH for OR planning    Respiratory: no active issues   - Monitor respiratory status    Cardiovascular: h/o HTN, HLD  - SBP goal < 160 -- start Clevaprex gtt if not controlled  - C/w simvastatin 20mg qD   - Home amlodipine 5mg qD + IV Metoprolol 5mg q6h for BP control  - Patient does not know dose of home losartan -- f/u with PCP in AM    Gastrointestinal: no active issues   - Diet: NPO except meds    Genitourinary/Renal: h/o prostate CA, in remission  - IVF: NS @ 125  - Monitor UOP  - Trend electrolytes on BMP qD, replete PRN  - C/w home tamsulosin    Heme: SDH  - Trend H/H on CBC qD  - VTE ppx: chemical ppx HELD iso SDH; SCDs for mechanical ppx    ID: no active issues   - Trend WBC on CBC qD  - Monitor fever curve    Endocrine: no active issues   - Trend FS on BMP    Lines:   - PIV x 2      Code Status: Full Code  Dispo: SICU   · Assessment	  87yo M with Hx HTN, HLD (NOT on AC) who presents with HA after fall 1 mo prior with imaging findings of 2.4cm L SDH with associated 0.4cm L subfalcine shift. SICU consulted for q1h neuro checks.     Interval events:  -taken to OR by NSGY  -salt tabs for hyponatremia  -get CTH tomorrow morning, start on SQH tomorrow afternoon    PLAN:  Neuro: L SDH  - q1h neuro checks  - Keppra for seizure ppx  - Pain control PRN  - NSGY planning for OR Tuesday 2/22  - STAT stereotactic CTH for OR planning    Respiratory: no active issues   - Monitor respiratory status    Cardiovascular: h/o HTN, HLD  - SBP goal < 160 -- start Clevaprex gtt if not controlled  - C/w simvastatin 20mg qD   - Home amlodipine 5mg qD + IV Metoprolol 5mg q6h for BP control  - Patient does not know dose of home losartan -- f/u with PCP in AM    Gastrointestinal: no active issues   - Diet: NPO except meds    Genitourinary/Renal: h/o prostate CA, in remission  - IVF: NS @ 125  - Monitor UOP  - Trend electrolytes on BMP qD, replete PRN  - C/w home tamsulosin  - salt tabs    Heme: SDH  - Trend H/H on CBC qD  - VTE ppx: chemical ppx HELD iso SDH; SCDs for mechanical ppx  - start on SQH 2/23 afternoon    ID: no active issues   - Trend WBC on CBC qD  - Monitor fever curve    Endocrine: no active issues   - Trend FS on BMP    Lines:   - PIV x 2      Code Status: Full Code  Dispo: SICU   · Assessment	  85yo M with Hx HTN, HLD (NOT on AC) who presents with HA after fall 1 mo prior with imaging findings of 2.4cm L SDH with associated 0.4cm L subfalcine shift. SICU consulted for q1h neuro checks.     Interval events:  -OR add-on for NSG  -salt tabs for hyponatremia  -get CTH tomorrow morning, start on SQH tomorrow afternoon    PLAN:  Neuro: L SDH  - q1h neuro checks  - Keppra for seizure ppx  - Pain control PRN  - NSGY planning for OR Tuesday 2/22  - STAT stereotactic CTH for OR planning    Respiratory: no active issues   - Monitor respiratory status    Cardiovascular: h/o HTN, HLD  - SBP goal < 160 -- start Clevaprex gtt if not controlled  - C/w simvastatin 20mg qD   - Home amlodipine 5mg qD + IV Metoprolol 5mg q6h for BP control  - Patient does not know dose of home losartan -- f/u with PCP in AM    Gastrointestinal: no active issues   - Diet: NPO except meds    Genitourinary/Renal: h/o prostate CA, in remission  - IVF: NS @ 125  - Monitor UOP  - Trend electrolytes on BMP qD, replete PRN  - C/w home tamsulosin  - salt tabs    Heme: SDH  - Trend H/H on CBC qD  - VTE ppx: chemical ppx HELD iso SDH; SCDs for mechanical ppx  - start on SQH 2/23 afternoon    ID: no active issues   - Trend WBC on CBC qD  - Monitor fever curve    Endocrine: no active issues   - Trend FS on BMP    Lines:   - PIV x 2      Code Status: Full Code  Dispo: SICU

## 2022-02-22 NOTE — CONSULT NOTE ADULT - SUBJECTIVE AND OBJECTIVE BOX
CHIEF COMPLAINT:Patient is a 86y old  Male who presents with a chief complaint of SDH (22 Feb 2022 05:28)      HISTORY OF PRESENT ILLNESS:    86 male with HTN, HLD, prostate ca s/p fall now with SDH planned for evacuation   it is not clear if he had syncope , he states he thinks he passed out while walking   he denies any chest pain, sob, palpitation, dizziness .  exercise capacity is greater than 4 METs.       PAST MEDICAL & SURGICAL HISTORY:  HTN (hypertension)    HLD (hyperlipidemia)    Prostate cancer    No significant past surgical history            MEDICATIONS:  amLODIPine   Tablet 5 milliGRAM(s) Oral daily  metoprolol tartrate Injectable 5 milliGRAM(s) IV Push every 6 hours  tamsulosin 0.4 milliGRAM(s) Oral at bedtime        levETIRAcetam  IVPB 500 milliGRAM(s) IV Intermittent every 12 hours      simvastatin 20 milliGRAM(s) Oral at bedtime    sodium chloride 0.9%. 1000 milliLiter(s) IV Continuous <Continuous>      FAMILY HISTORY:      Non-contributory    SOCIAL HISTORY:    No tobacco, drugs or etoh    Allergies    No Known Allergies    Intolerances    	    REVIEW OF SYSTEMS:  as above  The rest of the 14 points ROS reviewed and except above they are unremarkable.        PHYSICAL EXAM:  T(C): 36.1 (02-22-22 @ 04:00), Max: 36.8 (02-22-22 @ 00:19)  HR: 63 (02-22-22 @ 06:00) (63 - 89)  BP: 128/67 (02-22-22 @ 06:00) (126/68 - 183/97)  RR: 15 (02-22-22 @ 06:00) (15 - 20)  SpO2: 95% (02-22-22 @ 06:00) (94% - 100%)  Wt(kg): --  I&O's Summary    21 Feb 2022 07:01  -  22 Feb 2022 07:00  --------------------------------------------------------  IN: 975 mL / OUT: 0 mL / NET: 975 mL        JVP: Normal  Neck: supple  Lung: clear   CV: S1 S2 , Murmur:  Abd: soft  Ext: No edema  neuro: Awake / alert  Psych: flat affect  Skin: normal      LABS/DATA:    TELEMETRY: 	    ECG:  	sinus , ? old lateral infarct    	  CARDIAC MARKERS:                        14 <<== 02-21-22 @ 21:58                              12.2   8.33  )-----------( 225      ( 22 Feb 2022 01:32 )             36.3     02-22    135  |  106  |  32<H>  ----------------------------<  106<H>  4.8   |  23  |  2.05<H>    Ca    8.7      22 Feb 2022 01:32  Phos  3.1     02-22  Mg     2.00     02-22    TPro  6.6  /  Alb  3.9  /  TBili  0.2  /  DBili  x   /  AST  11  /  ALT  10  /  AlkPhos  73  02-21    proBNP:   Lipid Profile:   HgA1c:   TSH:

## 2022-02-22 NOTE — PATIENT PROFILE ADULT - BRAND OF COVID-19 VACCINATION
Denies problems or concerns.  Baby active. No cramping, jenniffer, or LOF.  Reviewed GBS for next visit.  Signs of labor reviewed and when to call [facility].  RTC in 2 weeks.   Pfizer dose 1, 2, and 3

## 2022-02-22 NOTE — PATIENT PROFILE ADULT - FALL HARM RISK - HARM RISK INTERVENTIONS

## 2022-02-22 NOTE — PROGRESS NOTE ADULT - SUBJECTIVE AND OBJECTIVE BOX
SICU Daily Progress Note    HISTORY  85yo M with Hx HTN, HLD, prostate CA, BCC, and SCC who presented with HA and OP imaging demonstrating a L SDH. Patient had fallen in January with (+) HS. Imaging and workup at [facility] was negative for acute injury and intracranial pathology at that time. After discharge, patient continued to have L occipital and posterior neck pain and presented to an OP neurologist, Dr. Morgan, who scheduled him for an OP MRI. MRI on 2/21 demonstrated a 2.4cm L SDH extending through the frontal, parietal, temporal, and occipital regions with compression of L frontal and parietal lobes and L lateral ventricle and associated 0.4cm L subfalcine shift. Patient was sent to ED by Dr. Morgan for NSGY evaluation. SICU consulted for q1h neuro checks iso large L SDH with midline shift.     Patient denies visual changes, nausea/emesis, numbness, tingling, weakness, or seizure-like activity since onset of headaches. States that he is not on anticoagulation. He has history of bilateral cateract surgery and has changes in his pupils as a result; per patient and daughter, pupil shape is at baseline.     NEURO  Exam: awake, alert, oriented. No focal deficits. Strength 5/5 b/l.  Meds: levETIRAcetam  IVPB 500 milliGRAM(s) IV Intermittent every 12 hours        RESPIRATORY  Ventilated: no    RR: RR: 13 (02-22-22 @ 08:00) (13 - 20) | O2 Sat: SpO2: 98% (02-22-22 @ 08:00) (94% - 100%)    Exam: unlabored, clear to auscultation bilaterally      CARDIOVASCULAR  Perfusion: adequate  Mentation: normal  Extremities: warm   HR: HR: 59 (02-22-22 @ 08:00) (59 - 89) | BP: BP: 150/76 (02-22-22 @ 08:00) (126/68 - 183/97) | MAP: BP(mean): 94 (02-22-22 @ 08:00) (81 - 117)    - Bicarb:   Carbon Dioxide, Serum: 23 mmol/L (02-22-22 @ 01:32)  Carbon Dioxide, Serum: 23 mmol/L (02-21-22 @ 21:58)      amLODIPine   Tablet 5 milliGRAM(s) Oral daily  levETIRAcetam  IVPB 500 milliGRAM(s) IV Intermittent every 12 hours  metoprolol tartrate Injectable 5 milliGRAM(s) IV Push every 6 hours  simvastatin 20 milliGRAM(s) Oral at bedtime  sodium chloride 0.9%. 1000 milliLiter(s) IV Continuous <Continuous>  tamsulosin 0.4 milliGRAM(s) Oral at bedtime    Lor: shellie    02-21-22 @ 07:01  -  02-22-22 @ 07:00  --------------------------------------------------------  IN: 975 mL / OUT: 0 mL / NET: 975 mL    02-22-22 @ 07:01  -  02-22-22 @ 09:51  --------------------------------------------------------  IN: 125 mL / OUT: 200 mL / NET: -75 mL      Exam: regular rate and rhythm  Meds: amLODIPine   Tablet 5 milliGRAM(s) Oral daily  metoprolol tartrate Injectable 5 milliGRAM(s) IV Push every 6 hours  tamsulosin 0.4 milliGRAM(s) Oral at bedtime        GI/NUTRITION  Diet: NPO     GENITOURINARY  I&O's Detail    02-21 @ 07:01  -  02-22 @ 07:00  --------------------------------------------------------  IN:    IV PiggyBack: 100 mL    sodium chloride 0.9%: 875 mL  Total IN: 975 mL    OUT:  Total OUT: 0 mL    Total NET: 975 mL      02-22 @ 07:01 - 02-22 @ 09:51  --------------------------------------------------------  IN:    sodium chloride 0.9%: 125 mL  Total IN: 125 mL    OUT:    Voided (mL): 200 mL  Total OUT: 200 mL    Total NET: -75 mL        Weight (kg): 60.1 (02-22 @ 01:30)  02-22    135  |  106  |  32<H>  ----------------------------<  106<H>  4.8   |  23  |  2.05<H>    Ca    8.7      22 Feb 2022 01:32  Phos  3.1     02-22  Mg     2.00     02-22    TPro  6.6  /  Alb  3.9  /  TBili  0.2  /  DBili  x   /  AST  11  /  ALT  10  /  AlkPhos  73  02-21    [ ] Horowitz catheter, indication: N/A  Meds: sodium chloride 0.9%. 1000 milliLiter(s) IV Continuous <Continuous>        HEMATOLOGIC  Meds:   [x] VTE Prophylaxis                        12.2   8.33  )-----------( 225      ( 22 Feb 2022 01:32 )             36.3     PT/INR - ( 22 Feb 2022 01:32 )   PT: 12.1 sec;   INR: 1.05 ratio         PTT - ( 22 Feb 2022 01:32 )  PTT:29.8 sec  Transfusion     [ ] PRBC   [ ] Platelets   [ ] FFP   [ ] Cryoprecipitate      INFECTIOUS DISEASES  WBC Count: 8.33 K/uL (02-22 @ 01:32)  WBC Count: 7.43 K/uL (02-21 @ 21:58)      Meds: simvastatin 20 milliGRAM(s) Oral at bedtime        ACCESS DEVICES:  [X] Peripheral IV  [ ] Central Venous Line	[ ] R	[ ] L	[ ] IJ	[ ] Fem	[ ] SC	                Placed:   [ ] Arterial Line		        [ ] R	[ ] L	        [ ] Fem	[ ] Rad	[ ] Ax	Placed:   [ ] PICC:					[ ] Mediport  [ ] Urinary Catheter, Date Placed:       CODE STATUS: full

## 2022-02-22 NOTE — CONSULT NOTE ADULT - ASSESSMENT
SDH  plan for evacuation   fu with NSX    Fall vs Syncope   monitor on tele   check orthostatic vitals later after surgery before discharge   obtain echo   obtain carotid doppler     HTN  stable  cont current meds    ?CKD vs HEMANT  obtain outpt records  consider renal eval     Pre-Operative Cardiac Risk Stratification and Optimization  Based on patient history and physical exam, the patient is considered to have elevated risk   CV stable to proceed to this urgent procedure   CV testing as above can be done after surgery

## 2022-02-23 DIAGNOSIS — I10 ESSENTIAL (PRIMARY) HYPERTENSION: ICD-10-CM

## 2022-02-23 DIAGNOSIS — S06.5X9A TRAUMATIC SUBDURAL HEMORRHAGE WITH LOSS OF CONSCIOUSNESS OF UNSPECIFIED DURATION, INITIAL ENCOUNTER: ICD-10-CM

## 2022-02-23 DIAGNOSIS — Z98.890 OTHER SPECIFIED POSTPROCEDURAL STATES: ICD-10-CM

## 2022-02-23 DIAGNOSIS — E78.5 HYPERLIPIDEMIA, UNSPECIFIED: ICD-10-CM

## 2022-02-23 LAB
ANION GAP SERPL CALC-SCNC: 11 MMOL/L — SIGNIFICANT CHANGE UP (ref 7–14)
APTT BLD: 30.1 SEC — SIGNIFICANT CHANGE UP (ref 27–36.3)
BUN SERPL-MCNC: 27 MG/DL — HIGH (ref 7–23)
CALCIUM SERPL-MCNC: 8 MG/DL — LOW (ref 8.4–10.5)
CHLORIDE SERPL-SCNC: 111 MMOL/L — HIGH (ref 98–107)
CO2 SERPL-SCNC: 19 MMOL/L — LOW (ref 22–31)
CREAT SERPL-MCNC: 1.56 MG/DL — HIGH (ref 0.5–1.3)
GLUCOSE SERPL-MCNC: 84 MG/DL — SIGNIFICANT CHANGE UP (ref 70–99)
HCT VFR BLD CALC: 34.6 % — LOW (ref 39–50)
HGB BLD-MCNC: 11.6 G/DL — LOW (ref 13–17)
INR BLD: 1.15 RATIO — SIGNIFICANT CHANGE UP (ref 0.88–1.16)
MAGNESIUM SERPL-MCNC: 1.9 MG/DL — SIGNIFICANT CHANGE UP (ref 1.6–2.6)
MCHC RBC-ENTMCNC: 31.9 PG — SIGNIFICANT CHANGE UP (ref 27–34)
MCHC RBC-ENTMCNC: 33.5 GM/DL — SIGNIFICANT CHANGE UP (ref 32–36)
MCV RBC AUTO: 95.1 FL — SIGNIFICANT CHANGE UP (ref 80–100)
NRBC # BLD: 0 /100 WBCS — SIGNIFICANT CHANGE UP
NRBC # FLD: 0 K/UL — SIGNIFICANT CHANGE UP
PHOSPHATE SERPL-MCNC: 2.9 MG/DL — SIGNIFICANT CHANGE UP (ref 2.5–4.5)
PLATELET # BLD AUTO: 210 K/UL — SIGNIFICANT CHANGE UP (ref 150–400)
POTASSIUM SERPL-MCNC: 4.2 MMOL/L — SIGNIFICANT CHANGE UP (ref 3.5–5.3)
POTASSIUM SERPL-SCNC: 4.2 MMOL/L — SIGNIFICANT CHANGE UP (ref 3.5–5.3)
PROTHROM AB SERPL-ACNC: 13.1 SEC — SIGNIFICANT CHANGE UP (ref 10.6–13.6)
RBC # BLD: 3.64 M/UL — LOW (ref 4.2–5.8)
RBC # FLD: 12.8 % — SIGNIFICANT CHANGE UP (ref 10.3–14.5)
SODIUM SERPL-SCNC: 141 MMOL/L — SIGNIFICANT CHANGE UP (ref 135–145)
WBC # BLD: 8.04 K/UL — SIGNIFICANT CHANGE UP (ref 3.8–10.5)
WBC # FLD AUTO: 8.04 K/UL — SIGNIFICANT CHANGE UP (ref 3.8–10.5)

## 2022-02-23 PROCEDURE — 70450 CT HEAD/BRAIN W/O DYE: CPT | Mod: 26

## 2022-02-23 PROCEDURE — 99233 SBSQ HOSP IP/OBS HIGH 50: CPT

## 2022-02-23 PROCEDURE — 99232 SBSQ HOSP IP/OBS MODERATE 35: CPT

## 2022-02-23 DEVICE — PLATE LRG GAP SHUNT 14MM: Type: IMPLANTABLE DEVICE | Site: LEFT | Status: FUNCTIONAL

## 2022-02-23 DEVICE — PLATE COVER BURRHOLE UN3 W/TAB 14MM: Type: IMPLANTABLE DEVICE | Site: LEFT | Status: FUNCTIONAL

## 2022-02-23 DEVICE — BONE WAX 2.5GM: Type: IMPLANTABLE DEVICE | Site: LEFT | Status: FUNCTIONAL

## 2022-02-23 DEVICE — SURGIFLO MATRIX WITH THROMBIN KIT: Type: IMPLANTABLE DEVICE | Site: LEFT | Status: FUNCTIONAL

## 2022-02-23 DEVICE — SURGIFOAM PAD 8CM X 12.5CM X 2MM (100C): Type: IMPLANTABLE DEVICE | Site: LEFT | Status: FUNCTIONAL

## 2022-02-23 DEVICE — SCREW UN3 AXS SELF DRILL 1.5X4MM 5/PK: Type: IMPLANTABLE DEVICE | Site: LEFT | Status: FUNCTIONAL

## 2022-02-23 RX ORDER — SODIUM CHLORIDE 9 MG/ML
1000 INJECTION INTRAMUSCULAR; INTRAVENOUS; SUBCUTANEOUS
Refills: 0 | Status: DISCONTINUED | OUTPATIENT
Start: 2022-02-23 | End: 2022-02-24

## 2022-02-23 RX ORDER — CEFAZOLIN SODIUM 1 G
2000 VIAL (EA) INJECTION EVERY 8 HOURS
Refills: 0 | Status: COMPLETED | OUTPATIENT
Start: 2022-02-23 | End: 2022-02-24

## 2022-02-23 RX ORDER — FENTANYL CITRATE 50 UG/ML
25 INJECTION INTRAVENOUS
Refills: 0 | Status: DISCONTINUED | OUTPATIENT
Start: 2022-02-23 | End: 2022-02-23

## 2022-02-23 RX ORDER — ONDANSETRON 8 MG/1
4 TABLET, FILM COATED ORAL ONCE
Refills: 0 | Status: COMPLETED | OUTPATIENT
Start: 2022-02-23 | End: 2022-02-23

## 2022-02-23 RX ORDER — FENTANYL CITRATE 50 UG/ML
50 INJECTION INTRAVENOUS
Refills: 0 | Status: DISCONTINUED | OUTPATIENT
Start: 2022-02-23 | End: 2022-02-23

## 2022-02-23 RX ORDER — METOCLOPRAMIDE HCL 10 MG
5 TABLET ORAL ONCE
Refills: 0 | Status: COMPLETED | OUTPATIENT
Start: 2022-02-23 | End: 2022-02-24

## 2022-02-23 RX ADMIN — TAMSULOSIN HYDROCHLORIDE 0.4 MILLIGRAM(S): 0.4 CAPSULE ORAL at 21:36

## 2022-02-23 RX ADMIN — SODIUM CHLORIDE 125 MILLILITER(S): 9 INJECTION INTRAMUSCULAR; INTRAVENOUS; SUBCUTANEOUS at 01:46

## 2022-02-23 RX ADMIN — Medication 5 MILLIGRAM(S): at 05:14

## 2022-02-23 RX ADMIN — SODIUM CHLORIDE 1 GRAM(S): 9 INJECTION INTRAMUSCULAR; INTRAVENOUS; SUBCUTANEOUS at 18:56

## 2022-02-23 RX ADMIN — SODIUM CHLORIDE 1 GRAM(S): 9 INJECTION INTRAMUSCULAR; INTRAVENOUS; SUBCUTANEOUS at 05:13

## 2022-02-23 RX ADMIN — LEVETIRACETAM 400 MILLIGRAM(S): 250 TABLET, FILM COATED ORAL at 18:02

## 2022-02-23 RX ADMIN — AMLODIPINE BESYLATE 5 MILLIGRAM(S): 2.5 TABLET ORAL at 05:13

## 2022-02-23 RX ADMIN — Medication 100 MILLIGRAM(S): at 22:07

## 2022-02-23 RX ADMIN — ONDANSETRON 4 MILLIGRAM(S): 8 TABLET, FILM COATED ORAL at 22:43

## 2022-02-23 RX ADMIN — Medication 5 MILLIGRAM(S): at 12:56

## 2022-02-23 RX ADMIN — LEVETIRACETAM 400 MILLIGRAM(S): 250 TABLET, FILM COATED ORAL at 05:13

## 2022-02-23 RX ADMIN — SIMVASTATIN 20 MILLIGRAM(S): 20 TABLET, FILM COATED ORAL at 21:36

## 2022-02-23 RX ADMIN — Medication 5 MILLIGRAM(S): at 18:04

## 2022-02-23 NOTE — PROGRESS NOTE ADULT - SUBJECTIVE AND OBJECTIVE BOX
DATE OF SERVICE: 02-23-22 @ 06:04    Subjective: Patient seen and examined. No new events except as noted.     SUBJECTIVE/ROS:  nad      MEDICATIONS:  MEDICATIONS  (STANDING):  amLODIPine   Tablet 5 milliGRAM(s) Oral daily  levETIRAcetam  IVPB 500 milliGRAM(s) IV Intermittent every 12 hours  metoprolol tartrate Injectable 5 milliGRAM(s) IV Push every 6 hours  simvastatin 20 milliGRAM(s) Oral at bedtime  sodium chloride 1 Gram(s) Oral two times a day  sodium chloride 0.9%. 1000 milliLiter(s) (125 mL/Hr) IV Continuous <Continuous>  tamsulosin 0.4 milliGRAM(s) Oral at bedtime      PHYSICAL EXAM:  T(C): 36.4 (02-23-22 @ 05:38), Max: 36.6 (02-23-22 @ 02:00)  HR: 78 (02-23-22 @ 05:38) (56 - 78)  BP: 137/73 (02-23-22 @ 05:38) (122/60 - 150/76)  RR: 17 (02-23-22 @ 05:38) (12 - 22)  SpO2: 95% (02-23-22 @ 05:38) (95% - 100%)  Wt(kg): --  I&O's Summary    21 Feb 2022 07:01  -  22 Feb 2022 07:00  --------------------------------------------------------  IN: 975 mL / OUT: 0 mL / NET: 975 mL    22 Feb 2022 07:01  -  23 Feb 2022 06:04  --------------------------------------------------------  IN: 2800 mL / OUT: 1650 mL / NET: 1150 mL      Height (cm): 162.6 (02-23 @ 04:56)  Weight (kg): 60.1 (02-23 @ 04:56)  BMI (kg/m2): 22.7 (02-23 @ 04:56)  BSA (m2): 1.64 (02-23 @ 04:56)      JVP: Normal  Neck: supple  Lung: clear   CV: S1 S2 , Murmur:  Abd: soft  Ext: No edema  neuro: Awake / alert  Psych: flat affect  Skin: normal``    LABS/DATA:    CARDIAC MARKERS:    < from: Transthoracic Echocardiogram (02.22.22 @ 17:00) >  ------------------------------------------------------------------------  CONCLUSIONS:  1. Normal left ventricular systolic function. No segmental  wall motion abnormalities.  2. Increased E/e'  is consistent with elevated left  ventricular filling pressure.  3. Normal right ventricular size and function.  ------------------------------------------------------------------------  Confirmed on  2/22/2022 - 21:28:27 by David Barbosa M.D.  ------------------------------------------------------------------------    < end of copied text >                              12.2   8.33  )-----------( 225      ( 22 Feb 2022 01:32 )             36.3     02-22    135  |  106  |  32<H>  ----------------------------<  106<H>  4.8   |  23  |  2.05<H>    Ca    8.7      22 Feb 2022 01:32  Phos  3.1     02-22  Mg     2.00     02-22    TPro  6.6  /  Alb  3.9  /  TBili  0.2  /  DBili  x   /  AST  11  /  ALT  10  /  AlkPhos  73  02-21    proBNP:   Lipid Profile:   HgA1c:   TSH:     TELE:  EKG:

## 2022-02-23 NOTE — PROGRESS NOTE ADULT - SUBJECTIVE AND OBJECTIVE BOX
C/O headache  No issues overnight  ICU Vital Signs Last 24 Hrs  T(C): 36.3 (23 Feb 2022 00:00), Max: 36.5 (22 Feb 2022 16:00)  T(F): 97.3 (23 Feb 2022 00:00), Max: 97.7 (22 Feb 2022 16:00)  HR: 64 (23 Feb 2022 00:00) (56 - 76)  BP: 136/64 (23 Feb 2022 00:00) (122/60 - 168/87)  BP(mean): 80 (23 Feb 2022 00:00) (74 - 106)  ABP: --  ABP(mean): --  RR: 17 (23 Feb 2022 00:00) (12 - 22)  SpO2: 97% (23 Feb 2022 00:00) (95% - 99%)    AAO X 3  Pupils uneven  EOMI  CN 2-12 otherwise intact  LEMUS strength 5/5  No drift  SILT    MEDICATIONS  (STANDING):  amLODIPine   Tablet 5 milliGRAM(s) Oral daily  levETIRAcetam  IVPB 500 milliGRAM(s) IV Intermittent every 12 hours  metoprolol tartrate Injectable 5 milliGRAM(s) IV Push every 6 hours  simvastatin 20 milliGRAM(s) Oral at bedtime  sodium chloride 1 Gram(s) Oral two times a day  sodium chloride 0.9%. 1000 milliLiter(s) (125 mL/Hr) IV Continuous <Continuous>  tamsulosin 0.4 milliGRAM(s) Oral at bedtime    MEDICATIONS  (PRN):                          12.2   8.33  )-----------( 225      ( 22 Feb 2022 01:32 )             36.3     02-22    135  |  106  |  32<H>  ----------------------------<  106<H>  4.8   |  23  |  2.05<H>    Ca    8.7      22 Feb 2022 01:32  Phos  3.1     02-22  Mg     2.00     02-22    TPro  6.6  /  Alb  3.9  /  TBili  0.2  /  DBili  x   /  AST  11  /  ALT  10  /  AlkPhos  73  02-21    PT/INR - ( 22 Feb 2022 01:32 )   PT: 12.1 sec;   INR: 1.05 ratio         PTT - ( 22 Feb 2022 01:32 )  PTT:29.8 sec    Type + Screen (02.22.22 @ 01:11)    ABO Interpretation: O    Rh Interpretation: Positive    Antibody Screen: Negative    COVID-19 PCR: NotDetec (21 Feb 2022 22:23)    < from: Transthoracic Echocardiogram (02.22.22 @ 17:00) >  CONCLUSIONS:  1. Normal left ventricular systolic function. No segmental  wall motion abnormalities.  2. Increased E/e'  is consistent with elevated left  ventricular filling pressure.  3. Normal right ventricular size and function.    < end of copied text >

## 2022-02-23 NOTE — PROGRESS NOTE ADULT - ASSESSMENT
SDH  plan for evacuation   fu with NSX    Fall vs Syncope   monitor on tele   check orthostatic vitals later after surgery before discharge   unremarkable echo   can obtain carotid doppler after surgery     HTN  stable  cont current meds    ?CKD vs HEMANT  obtain outpt records  consider renal eval     Pre-Operative Cardiac Risk Stratification and Optimization  Based on patient history and physical exam, the patient is considered to have elevated risk   CV stable to proceed to this urgent procedure

## 2022-02-23 NOTE — PROGRESS NOTE ADULT - ASSESSMENT
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 Patient is preop for chava hole evacuation today. Cardiology has cleared patient for the planned procedure    2/23: Postop left chava holes, evacuation of SDH. Patient intact and symptomatically improved. DANE X 1 in place. Postop CT pending

## 2022-02-23 NOTE — PROGRESS NOTE ADULT - SUBJECTIVE AND OBJECTIVE BOX
SICU PROGRESS NOTE     SEBASTIÁN ROACH  87yo M with Hx HTN, HLD, prostate CA, BCC, and SCC who presented with HA and OP imaging demonstrating a L SDH. Patient had fallen in January with (+) HS. Imaging and workup at [facility] was negative for acute injury and intracranial pathology at that time. After discharge, patient continued to have L occipital and posterior neck pain and presented to an OP neurologist, Dr. Morgan, who scheduled him for an OP MRI. MRI on 2/21 demonstrated a 2.4cm L SDH extending through the frontal, parietal, temporal, and occipital regions with compression of L frontal and parietal lobes and L lateral ventricle and associated 0.4cm L subfalcine shift. Patient was sent to ED by Dr. Morgan for NSGY evaluation. SICU consulted for q1h neuro checks iso large L SDH with midline shift.     Patient briefly admitted to SICU for q1h neuro checks, determined to be stable for the floor 2/22. On 2/23, patient underwent chava hole and drain placement for SDH. SICU consulted for q1h neuro checks.    T(F): 98.1 (02-23-22 @ 15:00), Max: 98.1 (02-23-22 @ 15:00)  HR: 68 (02-23-22 @ 15:00) (62 - 78)  BP: 136/90 (02-23-22 @ 15:00) (122/60 - 148/78)  ABP: --  ABP(mean): --  RR: 18 (02-23-22 @ 15:00) (14 - 20)  SpO2: 100% (02-23-22 @ 15:00) (95% - 100%)    DIET/FLUIDS: sodium chloride 1 Gram(s) Oral two times a day  sodium chloride 0.9%. 1000 milliLiter(s) IV Continuous <Continuous>      URINE:      GI proph:    AC/ proph:   ABx: ceFAZolin   IVPB 2000 milliGRAM(s) IV Intermittent every 8 hours      PHYSICAL EXAM:  GENERAL: NAD, follows commands  HEENT: drain in place, dressing c/d/i  CHEST/LUNG: equal chest rise  HEART: Regular rate and rhythm  ABDOMEN: Soft, Nontender, Nondistended  EXTREMITIES:  No clubbing, cyanosis, or edema      LABS  Labs:  CAPILLARY BLOOD GLUCOSE                              11.6   8.04  )-----------( 210      ( 23 Feb 2022 07:05 )             34.6         02-23    141  |  111<H>  |  27<H>  ----------------------------<  84  4.2   |  19<L>  |  1.56<H>      Calcium, Total Serum: 8.0 mg/dL (02-23-22 @ 07:05)      LFTs:             6.6  | 0.2  | 11       ------------------[73      ( 21 Feb 2022 21:58 )  3.9  | x    | 10          Lipase:x      Amylase:x             Coags:     13.1   ----< 1.15    ( 23 Feb 2022 07:05 )     30.1              < from: CT Head No Cont (02.21.22 @ 22:39) >  CC: [id number] EXAM:  CT BRAIN                          PROCEDURE DATE:  02/21/2022          INTERPRETATION:  CLINICAL INFORMATION: Patient imaging demonstrating   subdural hematoma. Evaluate for hemorrhage    TECHNIQUE: Noncontrast axial CT images were acquired through the head.   Two-dimensional sagittal and coronal reformats were generated.    COMPARISON STUDY: MR brain and cervical spine from 2/21/2022 obtained at   outside hospital.    FINDINGS:    Mixed density left subdural hematoma measures a maximum depth of 2.6 cm   near the dome. Local mass effect on the adjacent parenchyma and left   lateral ventricle. Rightward 3 mm midline shift. No hydrocephalus or   basal cistern effacement.    Age-related cerebral volume loss and patchy white matter hypoattenuation   which is nonspecific in etiology but likely related to chronic   microvascular ischemic disease.    Left maxillary sinus partial opacification. Bilateral mastoid air cells   and middle ear cavities are clear.    Calvarium isintact.    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 Dr Leslie Shen was   informed by Dr. Machado with read back confirmation on 2/22/2022 at 12:18 AM.    Left maxillary sinus partial opacification consistent with fluid which   can be seen in the setting of sinusitis or blood product.    --- End of Report ---          HATTIE MACHADO MD; Resident Radiology  This document has been electronically signed.  ELIZABETH PHAM; Attending Radiologist  This document has been electronically signed. Feb 22 2022  1:26AM    < end of copied text >

## 2022-02-23 NOTE — PROGRESS NOTE ADULT - ASSESSMENT
87yo M with Hx HTN, HLD (NOT on AC) who presents with HA after fall 1 mo prior with imaging findings of 2.4cm L SDH with associated 0.4cm L subfalcine shift. Now s/p L chava hole and drain placement. SICU consulted for q1h neuro checks.     PLAN:  Neuro: L SDH s/p drain placement  - q1h neuro checks  - Keppra for seizure ppx  - Pain control PRN  - CT tonight after OR    Respiratory: no active issues   - Monitor respiratory status    Cardiovascular: h/o HTN, HLD  - SBP goal < 160 -- start Clevaprex gtt if not controlled  - C/w simvastatin 20mg qD   - Home amlodipine 5mg qD + IV Metoprolol 5mg q6h for BP control    Gastrointestinal: no active issues   - Diet: NPO except meds    Genitourinary/Renal: h/o prostate CA, in remission  - IVF: NS @ 125  - Monitor UOP  - Trend electrolytes on BMP qD, replete PRN  - C/w home tamsulosin    Heme: SDH  - Trend H/H on CBC qD  - VTE ppx: chemical ppx HELD iso SDH; SCDs for mechanical ppx    ID: no active issues   - Trend WBC on CBC qD  - Monitor fever curve    Endocrine: no active issues   - Trend FS on BMP    Lines:   - PIV x 2    Code Status: Full Code  Dispo: SICU   87yo M with Hx HTN, HLD (NOT on AC) who presents with HA after fall 1 mo prior with imaging findings of 2.4cm L SDH with associated 0.4cm L subfalcine shift. Now s/p L chava hole and drain placement. SICU consulted for q1h neuro checks.     PLAN:  Neuro: L SDH s/p drain placement  - q1h neuro checks  - Keppra for seizure ppx  - Pain control PRN  - CT tonight after OR    Respiratory: no active issues   - Monitor respiratory status    Cardiovascular: h/o HTN, HLD  - SBP goal < 160 -- start Clevaprex gtt if not controlled  - C/w simvastatin 20mg qD   - Home amlodipine 5mg qD + IV Metoprolol 5mg q6h for BP control    Gastrointestinal: no active issues   - Diet: NPO except meds    Genitourinary/Renal: h/o prostate CA, in remission  - IVF: NS @ 125  - Monitor UOP  - Trend electrolytes on BMP qD, replete PRN  - C/w home tamsulosin    Heme: SDH  - Trend H/H on CBC qD  - VTE ppx: chemical ppx HELD iso SDH; SCDs for mechanical ppx    ID: postsurgical prophylaxis  - periop ancef  - Trend WBC on CBC qD  - Monitor fever curve    Endocrine: no active issues   - Trend FS on BMP    Lines:   - PIV x 2    Code Status: Full Code  Dispo: SICU

## 2022-02-23 NOTE — PROGRESS NOTE ADULT - ASSESSMENT
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 Patient is preop for chava hole evacuation today. Cardiology has cleared patient for the planned procedure

## 2022-02-23 NOTE — PROGRESS NOTE ADULT - SUBJECTIVE AND OBJECTIVE BOX
Neurosurgery postop  Patient comfortable, headache resolved  ICU Vital Signs Last 24 Hrs  T(C): 36.3 (23 Feb 2022 18:30), Max: 36.7 (23 Feb 2022 15:00)  T(F): 97.4 (23 Feb 2022 18:30), Max: 98.1 (23 Feb 2022 15:00)  HR: 76 (23 Feb 2022 19:00) (62 - 87)  BP: 133/71 (23 Feb 2022 18:45) (122/60 - 149/73)  BP(mean): 86 (23 Feb 2022 18:45) (74 - 95)  ABP: --  ABP(mean): --  RR: 19 (23 Feb 2022 19:00) (12 - 19)  SpO2: 99% (23 Feb 2022 19:00) (92% - 100%)    AAO X 3  Pupils surgical, uneven  EOMI  CN 2-12 otherwise intact  LEMUS strength 5/5  No drift  SILT    Dressing C/D/I    DANE: 15cc    MEDICATIONS  (STANDING):  amLODIPine   Tablet 5 milliGRAM(s) Oral daily  ceFAZolin   IVPB 2000 milliGRAM(s) IV Intermittent every 8 hours  levETIRAcetam  IVPB 500 milliGRAM(s) IV Intermittent every 12 hours  metoprolol tartrate Injectable 5 milliGRAM(s) IV Push every 6 hours  simvastatin 20 milliGRAM(s) Oral at bedtime  sodium chloride 1 Gram(s) Oral two times a day  sodium chloride 0.9%. 1000 milliLiter(s) (75 mL/Hr) IV Continuous <Continuous>  tamsulosin 0.4 milliGRAM(s) Oral at bedtime    MEDICATIONS  (PRN):  fentaNYL    Injectable 25 MICROGram(s) IV Push every 5 minutes PRN Moderate Pain (4 - 6)  fentaNYL    Injectable 50 MICROGram(s) IV Push every 10 minutes PRN Severe Pain (7 - 10)  ondansetron Injectable 4 milliGRAM(s) IV Push once PRN Nausea and/or Vomiting                          11.6   8.04  )-----------( 210      ( 23 Feb 2022 07:05 )             34.6       02-23    141  |  111<H>  |  27<H>  ----------------------------<  84  4.2   |  19<L>  |  1.56<H>    Ca    8.0<L>      23 Feb 2022 07:05  Phos  2.9     02-23  Mg     1.90     02-23    TPro  6.6  /  Alb  3.9  /  TBili  0.2  /  DBili  x   /  AST  11  /  ALT  10  /  AlkPhos  73  02-21

## 2022-02-24 PROCEDURE — 99233 SBSQ HOSP IP/OBS HIGH 50: CPT

## 2022-02-24 RX ORDER — HEPARIN SODIUM 5000 [USP'U]/ML
5000 INJECTION INTRAVENOUS; SUBCUTANEOUS EVERY 8 HOURS
Refills: 0 | Status: DISCONTINUED | OUTPATIENT
Start: 2022-02-24 | End: 2022-02-25

## 2022-02-24 RX ORDER — LEVETIRACETAM 250 MG/1
500 TABLET, FILM COATED ORAL
Refills: 0 | Status: DISCONTINUED | OUTPATIENT
Start: 2022-02-24 | End: 2022-02-26

## 2022-02-24 RX ADMIN — Medication 100 MILLIGRAM(S): at 06:11

## 2022-02-24 RX ADMIN — Medication 5 MILLIGRAM(S): at 00:24

## 2022-02-24 RX ADMIN — SODIUM CHLORIDE 1 GRAM(S): 9 INJECTION INTRAMUSCULAR; INTRAVENOUS; SUBCUTANEOUS at 19:05

## 2022-02-24 RX ADMIN — LEVETIRACETAM 500 MILLIGRAM(S): 250 TABLET, FILM COATED ORAL at 19:05

## 2022-02-24 RX ADMIN — LEVETIRACETAM 400 MILLIGRAM(S): 250 TABLET, FILM COATED ORAL at 06:10

## 2022-02-24 RX ADMIN — Medication 5 MILLIGRAM(S): at 00:23

## 2022-02-24 RX ADMIN — SODIUM CHLORIDE 1 GRAM(S): 9 INJECTION INTRAMUSCULAR; INTRAVENOUS; SUBCUTANEOUS at 06:11

## 2022-02-24 RX ADMIN — TAMSULOSIN HYDROCHLORIDE 0.4 MILLIGRAM(S): 0.4 CAPSULE ORAL at 21:50

## 2022-02-24 RX ADMIN — HEPARIN SODIUM 5000 UNIT(S): 5000 INJECTION INTRAVENOUS; SUBCUTANEOUS at 21:51

## 2022-02-24 RX ADMIN — AMLODIPINE BESYLATE 5 MILLIGRAM(S): 2.5 TABLET ORAL at 06:12

## 2022-02-24 RX ADMIN — SIMVASTATIN 20 MILLIGRAM(S): 20 TABLET, FILM COATED ORAL at 21:51

## 2022-02-24 RX ADMIN — Medication 5 MILLIGRAM(S): at 06:11

## 2022-02-24 RX ADMIN — SODIUM CHLORIDE 75 MILLILITER(S): 9 INJECTION INTRAMUSCULAR; INTRAVENOUS; SUBCUTANEOUS at 00:23

## 2022-02-24 NOTE — PROGRESS NOTE ADULT - ASSESSMENT
85yo M with Hx HTN, HLD (NOT on AC) who presents with HA after fall 1 mo prior with imaging findings of 2.4cm L SDH with associated 0.4cm L subfalcine shift. Now s/p L chava hole and drain placement. SICU consulted for q1h neuro checks.     PLAN:  Neuro: L SDH s/p drain placement  - q1h neuro checks  - Keppra for seizure ppx  - Pain control PRN    Respiratory: no active issues   - Monitor respiratory status    Cardiovascular: h/o HTN, HLD  - SBP goal < 160 -- start Clevaprex gtt if not controlled  - C/w simvastatin 20mg qD   - Home amlodipine 5mg qD + IV Metoprolol 5mg q6h for BP control    Gastrointestinal: no active issues   - Diet: CLD    Genitourinary/Renal: h/o prostate CA, in remission  - IVF: NS @ 75  - Monitor UOP  - Trend electrolytes on BMP qD, replete PRN  - C/w home tamsulosin    Heme: SDH  - Trend H/H on CBC qD  - VTE ppx: chemical ppx HELD iso SDH; SCDs for mechanical ppx    ID: no active issues   - Trend WBC on CBC qD  - Monitor fever curve    Endocrine: no active issues   - Trend FS on BMP    Lines:   - PIV x 2    Code Status: Full Code  Dispo: SICU   85yo M with Hx HTN, HLD (NOT on AC) who presents with HA after fall 1 mo prior with imaging findings of 2.4cm L SDH with associated 0.4cm L subfalcine shift. Now s/p L chava hole and drain placement. SICU consulted for q1h neuro checks.     PLAN:  Neuro: L SDH s/p drain placement  - q2h neuro checks  - Keppra for seizure ppx  - Pain control PRN  - Will need CT scan on 2/25    Respiratory: no active issues   - Monitor respiratory status    Cardiovascular: h/o HTN, HLD  - SBP goal < 160 -- start Clevaprex gtt if not controlled  - C/w simvastatin 20mg qD   - Home amlodipine 5mg qD + IV Metoprolol 5mg q6h for BP control    Gastrointestinal: no active issues   - Diet: Regular    Genitourinary/Renal: h/o prostate CA, in remission  - IVLocked  - Monitor UOP  - Trend electrolytes on BMP qD, replete PRN  - C/w home tamsulosin    Heme: SDH  - Trend H/H on CBC qD  - VTE ppx: chemical ppx HELD iso SDH; SCDs for mechanical ppx    ID: no active issues   - Trend WBC on CBC qD  - Monitor fever curve    Endocrine: no active issues   - Trend FS on BMP    Lines:   - PIV x 2    Code Status: Full Code  Dispo: SICU

## 2022-02-24 NOTE — PROGRESS NOTE ADULT - SUBJECTIVE AND OBJECTIVE BOX
No issues overnight  ICU Vital Signs Last 24 Hrs  T(C): 36.5 (24 Feb 2022 00:00), Max: 36.7 (23 Feb 2022 15:00)  T(F): 97.7 (24 Feb 2022 00:00), Max: 98.1 (23 Feb 2022 15:00)  HR: 73 (24 Feb 2022 02:00) (68 - 90)  BP: 108/58 (24 Feb 2022 02:00) (108/58 - 155/79)  BP(mean): 70 (24 Feb 2022 02:00) (70 - 101)  ABP: --  ABP(mean): --  RR: 12 (24 Feb 2022 02:00) (12 - 20)  SpO2: 100% (24 Feb 2022 00:00) (92% - 100%)      AAO X 3  Pupils surgical, uneven  EOMI  CN 2-12 otherwise intact  LEMUS strength 5/5  No drift  SILT    DANE: 115cc    MEDICATIONS  (STANDING):  amLODIPine   Tablet 5 milliGRAM(s) Oral daily  ceFAZolin   IVPB 2000 milliGRAM(s) IV Intermittent every 8 hours  levETIRAcetam  IVPB 500 milliGRAM(s) IV Intermittent every 12 hours  metoprolol tartrate Injectable 5 milliGRAM(s) IV Push every 6 hours  simvastatin 20 milliGRAM(s) Oral at bedtime  sodium chloride 1 Gram(s) Oral two times a day  sodium chloride 0.9%. 1000 milliLiter(s) (75 mL/Hr) IV Continuous <Continuous>  tamsulosin 0.4 milliGRAM(s) Oral at bedtime    MEDICATIONS  (PRN):                          11.6   8.04  )-----------( 210      ( 23 Feb 2022 07:05 )             34.6     02-23    141  |  111<H>  |  27<H>  ----------------------------<  84  4.2   |  19<L>  |  1.56<H>    Ca    8.0<L>      23 Feb 2022 07:05  Phos  2.9     02-23  Mg     1.90     02-23

## 2022-02-24 NOTE — PROGRESS NOTE ADULT - SUBJECTIVE AND OBJECTIVE BOX
SICU Daily Progress Note  =====================================================  Interval events:   - s/p L Merry hole 2/23 for SDH     87yo M with Hx HTN, HLD, prostate CA, BCC, and SCC who presented with HA and OP imaging demonstrating a L SDH. Patient had fallen in January with (+) HS. Imaging and workup at [facility] was negative for acute injury and intracranial pathology at that time. After discharge, patient continued to have L occipital and posterior neck pain and presented to an OP neurologist, Dr. Morgan, who scheduled him for an OP MRI. MRI on 2/21 demonstrated a 2.4cm L SDH extending through the frontal, parietal, temporal, and occipital regions with compression of L frontal and parietal lobes and L lateral ventricle and associated 0.4cm L subfalcine shift. Patient was sent to ED by Dr. Morgan for NSGY evaluation. SICU consulted for q1h neuro checks iso large L SDH with midline shift    Allergies: No Known Allergies      MEDICATIONS:   --------------------------------------------------------------------------------------  Neurologic Medications  levETIRAcetam  IVPB 500 milliGRAM(s) IV Intermittent every 12 hours    Cardiovascular Medications  amLODIPine   Tablet 5 milliGRAM(s) Oral daily  metoprolol tartrate Injectable 5 milliGRAM(s) IV Push every 6 hours  tamsulosin 0.4 milliGRAM(s) Oral at bedtime    Gastrointestinal Medications  sodium chloride 1 Gram(s) Oral two times a day  sodium chloride 0.9%. 1000 milliLiter(s) IV Continuous <Continuous>    Antimicrobial/Immunologic Medications  ceFAZolin   IVPB 2000 milliGRAM(s) IV Intermittent every 8 hours    Endocrine/Metabolic Medications  simvastatin 20 milliGRAM(s) Oral at bedtime    --------------------------------------------------------------------------------------    VITAL SIGNS, INS/OUTS (last 24 hours):  --------------------------------------------------------------------------------------  Vital Signs Last 24 Hrs  T(C): 36.5 (24 Feb 2022 00:00), Max: 36.7 (23 Feb 2022 15:00)  T(F): 97.7 (24 Feb 2022 00:00), Max: 98.1 (23 Feb 2022 15:00)  HR: 87 (24 Feb 2022 00:00) (68 - 90)  BP: 152/77 (24 Feb 2022 00:00) (123/75 - 155/79)  BP(mean): 97 (24 Feb 2022 00:00) (80 - 101)  RR: 12 (24 Feb 2022 00:00) (12 - 20)  SpO2: 100% (24 Feb 2022 00:00) (92% - 100%)    22 Feb 2022 07:01  -  23 Feb 2022 07:00  --------------------------------------------------------  IN:    IV PiggyBack: 200 mL    Oral Fluid: 100 mL    sodium chloride 0.9%: 2625 mL  Total IN: 2925 mL    OUT:    Voided (mL): 1650 mL  Total OUT: 1650 mL    Total NET: 1275 mL      23 Feb 2022 07:01  -  24 Feb 2022 00:43  --------------------------------------------------------  IN:    IV PiggyBack: 150 mL    Oral Fluid: 175 mL    sodium chloride 0.9%: 1000 mL    sodium chloride 0.9%: 450 mL  Total IN: 1775 mL    OUT:    Bulb (mL): 85 mL    Voided (mL): 500 mL  Total OUT: 585 mL    Total NET: 1190 mL        --------------------------------------------------------------------------------------    PHYSICAL EXAM:  GENERAL: NAD, follows commands  HEENT: drain in place, dressing c/d/i  CHEST/LUNG: equal chest rise  HEART: Regular rate and rhythm  ABDOMEN: Soft, Nontender, Nondistended  EXTREMITIES:  No clubbing, cyanosis, or edema      SICU Daily Progress Note  =====================================================  Interval events:   - s/p L Merry hole 2/23 for SDH   - CT scan postop   - IV locked and progressed to a regular diet  - q2h neuro checks    87yo M with Hx HTN, HLD, prostate CA, BCC, and SCC who presented with HA and OP imaging demonstrating a L SDH. Patient had fallen in January with (+) HS. Imaging and workup at [facility] was negative for acute injury and intracranial pathology at that time. After discharge, patient continued to have L occipital and posterior neck pain and presented to an OP neurologist, Dr. Morgan, who scheduled him for an OP MRI. MRI on 2/21 demonstrated a 2.4cm L SDH extending through the frontal, parietal, temporal, and occipital regions with compression of L frontal and parietal lobes and L lateral ventricle and associated 0.4cm L subfalcine shift. Patient was sent to ED by Dr. Morgan for NSGY evaluation. SICU consulted for q1h neuro checks iso large L SDH with midline shift    Allergies: No Known Allergies      MEDICATIONS:   --------------------------------------------------------------------------------------  Neurologic Medications  levETIRAcetam  IVPB 500 milliGRAM(s) IV Intermittent every 12 hours    Cardiovascular Medications  amLODIPine   Tablet 5 milliGRAM(s) Oral daily  metoprolol tartrate Injectable 5 milliGRAM(s) IV Push every 6 hours  tamsulosin 0.4 milliGRAM(s) Oral at bedtime    Gastrointestinal Medications  sodium chloride 1 Gram(s) Oral two times a day  sodium chloride 0.9%. 1000 milliLiter(s) IV Continuous <Continuous>    Antimicrobial/Immunologic Medications  ceFAZolin   IVPB 2000 milliGRAM(s) IV Intermittent every 8 hours    Endocrine/Metabolic Medications  simvastatin 20 milliGRAM(s) Oral at bedtime    --------------------------------------------------------------------------------------    VITAL SIGNS, INS/OUTS (last 24 hours):  --------------------------------------------------------------------------------------  Vital Signs Last 24 Hrs  T(C): 36.5 (24 Feb 2022 00:00), Max: 36.7 (23 Feb 2022 15:00)  T(F): 97.7 (24 Feb 2022 00:00), Max: 98.1 (23 Feb 2022 15:00)  HR: 87 (24 Feb 2022 00:00) (68 - 90)  BP: 152/77 (24 Feb 2022 00:00) (123/75 - 155/79)  BP(mean): 97 (24 Feb 2022 00:00) (80 - 101)  RR: 12 (24 Feb 2022 00:00) (12 - 20)  SpO2: 100% (24 Feb 2022 00:00) (92% - 100%)    22 Feb 2022 07:01  -  23 Feb 2022 07:00  --------------------------------------------------------  IN:    IV PiggyBack: 200 mL    Oral Fluid: 100 mL    sodium chloride 0.9%: 2625 mL  Total IN: 2925 mL    OUT:    Voided (mL): 1650 mL  Total OUT: 1650 mL    Total NET: 1275 mL      23 Feb 2022 07:01  -  24 Feb 2022 00:43  --------------------------------------------------------  IN:    IV PiggyBack: 150 mL    Oral Fluid: 175 mL    sodium chloride 0.9%: 1000 mL    sodium chloride 0.9%: 450 mL  Total IN: 1775 mL    OUT:    Bulb (mL): 85 mL    Voided (mL): 500 mL  Total OUT: 585 mL    Total NET: 1190 mL        --------------------------------------------------------------------------------------    PHYSICAL EXAM:  GENERAL: NAD, follows commands  HEENT: drain in place, dressing c/d/i  CHEST/LUNG: equal chest rise  HEART: Regular rate and rhythm  ABDOMEN: Soft, Nontender, Nondistended  EXTREMITIES:  No clubbing, cyanosis, or edema

## 2022-02-24 NOTE — PROGRESS NOTE ADULT - ASSESSMENT
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 Patient is preop for chava hole evacuation today. Cardiology has cleared patient for the planned procedure    2/23: Postop left chava holes, evacuation of SDH. Patient intact and symptomatically improved. DANE X 1 in place. Postop CT pending  2/24: POD # 1 S/P left chava holes, evacuation of SDH. Patient intact and symptomatically improved. DANE X 1 in place. Postop CT performed, official report pending

## 2022-02-24 NOTE — PROGRESS NOTE ADULT - ASSESSMENT
SDH  s/p drain   fu with nsx    Fall vs Syncope   monitor on tele   check orthostatic vitals later after surgery before discharge   unremarkable echo   carotid doppler recommended     HTN  stable  cont current meds

## 2022-02-24 NOTE — PROGRESS NOTE ADULT - SUBJECTIVE AND OBJECTIVE BOX
DATE OF SERVICE: 02-24-22 @ 11:10    Subjective: Patient seen and examined. No new events except as noted.     SUBJECTIVE/ROS:  nad       MEDICATIONS:  MEDICATIONS  (STANDING):  amLODIPine   Tablet 5 milliGRAM(s) Oral daily  levETIRAcetam 500 milliGRAM(s) Oral two times a day  simvastatin 20 milliGRAM(s) Oral at bedtime  sodium chloride 1 Gram(s) Oral two times a day  tamsulosin 0.4 milliGRAM(s) Oral at bedtime      PHYSICAL EXAM:  T(C): 37.2 (02-24-22 @ 08:00), Max: 37.2 (02-24-22 @ 08:00)  HR: 92 (02-24-22 @ 10:00) (68 - 92)  BP: 132/64 (02-24-22 @ 10:00) (108/58 - 155/79)  RR: 24 (02-24-22 @ 10:00) (12 - 24)  SpO2: 100% (02-24-22 @ 10:00) (92% - 100%)  Wt(kg): --  I&O's Summary    23 Feb 2022 07:01  -  24 Feb 2022 07:00  --------------------------------------------------------  IN: 2450 mL / OUT: 1160 mL / NET: 1290 mL      Height (cm): 162.6 (02-23 @ 23:30)  Weight (kg): 66.2 (02-23 @ 23:30)  BMI (kg/m2): 25 (02-23 @ 23:30)  BSA (m2): 1.71 (02-23 @ 23:30)      JVP: Normal  Neck: supple  Lung: clear   CV: S1 S2 , Murmur:  Abd: soft  Ext: No edema  neuro: Awake / alert  Psych: flat affect  Skin: normal``    LABS/DATA:    CARDIAC MARKERS:                                11.6   8.04  )-----------( 210      ( 23 Feb 2022 07:05 )             34.6     02-23    141  |  111<H>  |  27<H>  ----------------------------<  84  4.2   |  19<L>  |  1.56<H>    Ca    8.0<L>      23 Feb 2022 07:05  Phos  2.9     02-23  Mg     1.90     02-23      proBNP:   Lipid Profile:   HgA1c:   TSH:     TELE:  EKG:

## 2022-02-25 LAB
ANION GAP SERPL CALC-SCNC: 11 MMOL/L — SIGNIFICANT CHANGE UP (ref 7–14)
APTT BLD: 26.5 SEC — LOW (ref 27–36.3)
BUN SERPL-MCNC: 22 MG/DL — SIGNIFICANT CHANGE UP (ref 7–23)
CALCIUM SERPL-MCNC: 8.6 MG/DL — SIGNIFICANT CHANGE UP (ref 8.4–10.5)
CHLORIDE SERPL-SCNC: 110 MMOL/L — HIGH (ref 98–107)
CO2 SERPL-SCNC: 21 MMOL/L — LOW (ref 22–31)
CREAT SERPL-MCNC: 1.65 MG/DL — HIGH (ref 0.5–1.3)
GLUCOSE SERPL-MCNC: 97 MG/DL — SIGNIFICANT CHANGE UP (ref 70–99)
HCT VFR BLD CALC: 34.6 % — LOW (ref 39–50)
HGB BLD-MCNC: 11.7 G/DL — LOW (ref 13–17)
INR BLD: 1.17 RATIO — HIGH (ref 0.88–1.16)
MAGNESIUM SERPL-MCNC: 2.1 MG/DL — SIGNIFICANT CHANGE UP (ref 1.6–2.6)
MCHC RBC-ENTMCNC: 31.6 PG — SIGNIFICANT CHANGE UP (ref 27–34)
MCHC RBC-ENTMCNC: 33.8 GM/DL — SIGNIFICANT CHANGE UP (ref 32–36)
MCV RBC AUTO: 93.5 FL — SIGNIFICANT CHANGE UP (ref 80–100)
NRBC # BLD: 0 /100 WBCS — SIGNIFICANT CHANGE UP
NRBC # FLD: 0 K/UL — SIGNIFICANT CHANGE UP
PHOSPHATE SERPL-MCNC: 2.4 MG/DL — LOW (ref 2.5–4.5)
PLATELET # BLD AUTO: 234 K/UL — SIGNIFICANT CHANGE UP (ref 150–400)
POTASSIUM SERPL-MCNC: 4.1 MMOL/L — SIGNIFICANT CHANGE UP (ref 3.5–5.3)
POTASSIUM SERPL-SCNC: 4.1 MMOL/L — SIGNIFICANT CHANGE UP (ref 3.5–5.3)
PROTHROM AB SERPL-ACNC: 13.6 SEC — HIGH (ref 10.5–13.4)
RBC # BLD: 3.7 M/UL — LOW (ref 4.2–5.8)
RBC # FLD: 13.1 % — SIGNIFICANT CHANGE UP (ref 10.3–14.5)
SODIUM SERPL-SCNC: 142 MMOL/L — SIGNIFICANT CHANGE UP (ref 135–145)
WBC # BLD: 11.59 K/UL — HIGH (ref 3.8–10.5)
WBC # FLD AUTO: 11.59 K/UL — HIGH (ref 3.8–10.5)

## 2022-02-25 PROCEDURE — 70450 CT HEAD/BRAIN W/O DYE: CPT | Mod: 26

## 2022-02-25 PROCEDURE — 99232 SBSQ HOSP IP/OBS MODERATE 35: CPT

## 2022-02-25 RX ORDER — ACETAMINOPHEN 500 MG
975 TABLET ORAL EVERY 6 HOURS
Refills: 0 | Status: DISCONTINUED | OUTPATIENT
Start: 2022-02-25 | End: 2022-02-26

## 2022-02-25 RX ORDER — HEPARIN SODIUM 5000 [USP'U]/ML
5000 INJECTION INTRAVENOUS; SUBCUTANEOUS EVERY 8 HOURS
Refills: 0 | Status: DISCONTINUED | OUTPATIENT
Start: 2022-02-25 | End: 2022-02-26

## 2022-02-25 RX ADMIN — AMLODIPINE BESYLATE 5 MILLIGRAM(S): 2.5 TABLET ORAL at 05:35

## 2022-02-25 RX ADMIN — LEVETIRACETAM 500 MILLIGRAM(S): 250 TABLET, FILM COATED ORAL at 17:13

## 2022-02-25 RX ADMIN — SODIUM CHLORIDE 1 GRAM(S): 9 INJECTION INTRAMUSCULAR; INTRAVENOUS; SUBCUTANEOUS at 17:12

## 2022-02-25 RX ADMIN — Medication 975 MILLIGRAM(S): at 22:22

## 2022-02-25 RX ADMIN — Medication 975 MILLIGRAM(S): at 09:15

## 2022-02-25 RX ADMIN — Medication 975 MILLIGRAM(S): at 08:32

## 2022-02-25 RX ADMIN — HEPARIN SODIUM 5000 UNIT(S): 5000 INJECTION INTRAVENOUS; SUBCUTANEOUS at 05:59

## 2022-02-25 RX ADMIN — SODIUM CHLORIDE 1 GRAM(S): 9 INJECTION INTRAMUSCULAR; INTRAVENOUS; SUBCUTANEOUS at 05:34

## 2022-02-25 RX ADMIN — HEPARIN SODIUM 5000 UNIT(S): 5000 INJECTION INTRAVENOUS; SUBCUTANEOUS at 21:53

## 2022-02-25 RX ADMIN — SIMVASTATIN 20 MILLIGRAM(S): 20 TABLET, FILM COATED ORAL at 21:53

## 2022-02-25 RX ADMIN — TAMSULOSIN HYDROCHLORIDE 0.4 MILLIGRAM(S): 0.4 CAPSULE ORAL at 21:53

## 2022-02-25 RX ADMIN — LEVETIRACETAM 500 MILLIGRAM(S): 250 TABLET, FILM COATED ORAL at 05:34

## 2022-02-25 RX ADMIN — Medication 975 MILLIGRAM(S): at 21:52

## 2022-02-25 RX ADMIN — HEPARIN SODIUM 5000 UNIT(S): 5000 INJECTION INTRAVENOUS; SUBCUTANEOUS at 14:27

## 2022-02-25 NOTE — PROGRESS NOTE ADULT - ASSESSMENT
SDH  s/p drain   fu with nsx    Fall vs Syncope   unremarkable echo   carotid doppler recommended     HTN  stable  cont current meds

## 2022-02-25 NOTE — PHYSICAL THERAPY INITIAL EVALUATION ADULT - ADDITIONAL COMMENTS
Patient lives with daughter in private home, 6 steps to enter + flight to bedroom/bathroom. Patient was independent in all ADL prior to admission. Patient was not using assistive device prior to admission.

## 2022-02-25 NOTE — PROGRESS NOTE ADULT - SUBJECTIVE AND OBJECTIVE BOX
No issues overnight  ICU Vital Signs Last 24 Hrs  T(C): 36.6 (25 Feb 2022 00:00), Max: 37.2 (24 Feb 2022 08:00)  T(F): 97.9 (25 Feb 2022 00:00), Max: 98.9 (24 Feb 2022 08:00)  HR: 68 (25 Feb 2022 02:00) (68 - 92)  BP: 106/63 (25 Feb 2022 02:00) (106/63 - 139/75)  BP(mean): 71 (25 Feb 2022 02:00) (61 - 95)  ABP: --  ABP(mean): --  RR: 14 (25 Feb 2022 02:00) (13 - 24)  SpO2: 100% (25 Feb 2022 02:00) (99% - 100%)    AAO X 3  Pupils surgical, uneven  EOMI  CN 2-12 otherwise intact  LEMUS strength 5/5  No drift  SILT    DANE: 60cc    MEDICATIONS  (STANDING):  amLODIPine   Tablet 5 milliGRAM(s) Oral daily  heparin   Injectable 5000 Unit(s) SubCutaneous every 8 hours  levETIRAcetam 500 milliGRAM(s) Oral two times a day  simvastatin 20 milliGRAM(s) Oral at bedtime  sodium chloride 1 Gram(s) Oral two times a day  tamsulosin 0.4 milliGRAM(s) Oral at bedtime    MEDICATIONS  (PRN):                          11.6   8.04  )-----------( 210      ( 23 Feb 2022 07:05 )             34.6     02-23    141  |  111<H>  |  27<H>  ----------------------------<  84  4.2   |  19<L>  |  1.56<H>    Ca    8.0<L>      23 Feb 2022 07:05  Phos  2.9     02-23  Mg     1.90     02-23    < from: CT Head No Cont (02.23.22 @ 21:12) >  Postoperative changes compatible with a left frontal and high left   posterior frontal parietal chava holes are identified. This findings are   new when compared with the prior exam. The high left posterior   frontal/parietal chava hole does demonstrate subdural drain. There has   been evacuation of the previously noted acute on chronic subdural   hematoma. Small residual mixed attenuated subdural hematoma with   associated air is identified. This finding measures approximately 1.1 cm   and the subdural hematoma previously measured approximately 2.6 cm.   Decreased mass effect on the left lateral ventricle is seen. Chronic   subdural hematoma involving the right frontal region is again seen and   unchanged. This finding measures approximately 1.3 cm widest diameter and   previously measured approximately 0.4 cm in widest diameter. Evaluation   of osseous structures with the appropriatewindow aside from postop   changes appear normal    Extracalvarial soft tissue swelling and stables are seen in the   postoperative region.    IMPRESSION: Postop changes as described above.    < end of copied text >

## 2022-02-25 NOTE — PHYSICAL THERAPY INITIAL EVALUATION ADULT - PERTINENT HX OF CURRENT PROBLEM, REHAB EVAL
Per daughter was last seen by oncologist last year and is in remission and was advised to f/u in 1 year. Patient fell 1 month ago onto concrete was taken to [facility] at that time and dc home. He has been having occipital Ha x 3 weeks and an oupt mri head and C/s  was done by neurologist Dr. Clayton today. The mri showed a large subacute on chronic SDH left side with shift and patient was advised to come to ED.

## 2022-02-25 NOTE — PROGRESS NOTE ADULT - ASSESSMENT
85yo M with Hx HTN, HLD (NOT on AC) who presents with HA after fall 1 mo prior with imaging findings of 2.4cm L SDH with associated 0.4cm L subfalcine shift. Now s/p L chava hole and drain placement. SICU consulted for q1h neuro checks.     PLAN:  Neuro: L SDH s/p drain placement  - q2h neuro checks  - Keppra for seizure ppx  - Pain control PRN  - f/u with neurosx for possible d/c drain    Respiratory: no active issues   - Monitor respiratory status    Cardiovascular: h/o HTN, HLD  - C/w simvastatin 20mg qD   - Home amlodipine 5mg qD + IV Metoprolol 5mg q6h for BP control    Gastrointestinal: no active issues   - Diet: Regular    Genitourinary/Renal: h/o prostate CA, in remission  - IVLocked  - Monitor UOP  - Trend electrolytes on BMP qD, replete PRN  - C/w home tamsulosin    Heme: SDH  - Trend H/H on CBC qD  - VTE ppx: chemical ppx HELD iso SDH; SCDs for mechanical ppx    ID: no active issues   - Trend WBC on CBC qD  - Monitor fever curve    Endocrine: no active issues   - Trend FS on BMP    Lines:   - PIV x 2    Code Status: Full Code  Dispo: SICU   85yo M with Hx HTN, HLD (NOT on AC) who presents with HA after fall 1 mo prior with imaging findings of 2.4cm L SDH with associated 0.4cm L subfalcine shift. Now s/p L chava hole and drain placement. SICU consulted for q1h neuro checks.     Interval events:   - Drain removed by Neurosurgery  - f/u CT scan   - Listed for floor pending CT   - Restart SQH     PLAN:  Neuro: L SDH s/p drain placement  - q2h neuro checks  - Keppra for seizure ppx  - Pain control PRN  - f/u with neurosx for possible d/c drain    Respiratory: no active issues   - Monitor respiratory status    Cardiovascular: h/o HTN, HLD  - C/w simvastatin 20mg qD   - Home amlodipine 5mg qD + IV Metoprolol 5mg q6h for BP control    Gastrointestinal: no active issues   - Diet: Regular    Genitourinary/Renal: h/o prostate CA, in remission  - IVLocked  - Monitor UOP  - Trend electrolytes on BMP qD, replete PRN  - C/w home tamsulosin    Heme: SDH  - Trend H/H on CBC qD  - VTE ppx: SQH    ID: no active issues   - Trend WBC on CBC qD  - Monitor fever curve    Endocrine: no active issues   - Trend FS on BMP    Lines:   - PIV x 2    Code Status: Full Code  Dispo: floor

## 2022-02-25 NOTE — PROGRESS NOTE ADULT - ASSESSMENT
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 Patient is preop for chava hole evacuation today. Cardiology has cleared patient for the planned procedure    2/23: Postop left chava holes, evacuation of SDH. Patient intact and symptomatically improved. DANE X 1 in place. Postop CT pending  2/24: POD # 1 S/P left chava holes, evacuation of SDH. Patient intact and symptomatically improved. DANE X 1 in place. Postop CT performed, official report pending  2/25: POD # 2 S/P left chava holes, evacuation of SDH. Patient intact and symptomatically improved. DANE X 1 in place.

## 2022-02-25 NOTE — PROGRESS NOTE ADULT - SUBJECTIVE AND OBJECTIVE BOX
DATE OF SERVICE: 02-25-22 @ 10:27    Subjective: Patient seen and examined. No new events except as noted.     SUBJECTIVE/ROS:    and     MEDICATIONS:  MEDICATIONS  (STANDING):  amLODIPine   Tablet 5 milliGRAM(s) Oral daily  levETIRAcetam 500 milliGRAM(s) Oral two times a day  simvastatin 20 milliGRAM(s) Oral at bedtime  sodium chloride 1 Gram(s) Oral two times a day  tamsulosin 0.4 milliGRAM(s) Oral at bedtime      PHYSICAL EXAM:  T(C): 36.1 (02-25-22 @ 08:00), Max: 37.2 (02-24-22 @ 16:00)  HR: 83 (02-25-22 @ 08:00) (66 - 99)  BP: 138/86 (02-25-22 @ 08:00) (106/63 - 139/75)  RR: 18 (02-25-22 @ 08:00) (12 - 24)  SpO2: 100% (02-25-22 @ 08:00) (99% - 100%)  Wt(kg): --  I&O's Summary    24 Feb 2022 07:01  -  25 Feb 2022 07:00  --------------------------------------------------------  IN: 0 mL / OUT: 1080 mL / NET: -1080 mL    25 Feb 2022 07:01  -  25 Feb 2022 10:27  --------------------------------------------------------  IN: 0 mL / OUT: 300 mL / NET: -300 mL            JVP: Normal  Neck: supple  Lung: clear   CV: S1 S2 , Murmur:  Abd: soft  Ext: No edema  neuro: Awake / alert  Psych: flat affect  Skin: normal``    LABS/DATA:    CARDIAC MARKERS:                                11.7   11.59 )-----------( 234      ( 25 Feb 2022 06:21 )             34.6     02-25    142  |  110<H>  |  22  ----------------------------<  97  4.1   |  21<L>  |  1.65<H>    Ca    8.6      25 Feb 2022 06:21  Phos  2.4     02-25  Mg     2.10     02-25      proBNP:   Lipid Profile:   HgA1c:   TSH:     TELE:  EKG:

## 2022-02-25 NOTE — PROGRESS NOTE ADULT - ATTENDING COMMENTS
I agree with the above detailed interval history, physical, and plan, which I have reviewed and edited where appropriate; also agree with notes/assessment and of the team on service.  I have personally examined the patient, reviewed all data.  The plan is specified below:  The patient is in SICU with diagnosis mentioned in the note.    The SICU team has a constant risk benefit analyzes discussion and coordinating care with the primary team, all consultants, House Staff and PA's.  I was physically present for the key portions of the evaluation and management (E/M) service provided.  85yo M s/p L chava hole and drain placement in SICU  for neuro checks.   Ambulating   HEENT:  dressing c/d/i  CHEST/LUNG: clear  HEART: Regular   ABDOMEN: Soft, Nontender  PLAN:  Neuro: L SDH s/p drain placement  - q4h neuro checks  - Keppra   Respiratory:   - Monitor respiratory status  Cardiovascular: HTN, HLD  - C/w simvastatin 20mg qD   - Gastrointestinal:   - Diet: Regular  Genitourinary/Renal:  prostate CA  - IVLocked  Heme: SDH  - VTE ppx: SQH  ID:   - Monitor fever curve  Endocrine:   - Trend FS on BMP  Code Status: Full Code  Dispo: floor
I agree with the detailed interval history, physical, and plan, which I have reviewed and edited where appropriate'; also agree with notes/assessment with my team on service.  I have personally examined the patient.  I was physically present for the key portions of the evaluation and management (E/M) service provided.  I reviewed all the pertinent data.  The patient is a critical care patient with life threatening hemodynamic and metabolic instability in SICU.  The SICU team has a constant risk benefit analyzes discussion and coordinating care with the primary team and all consultants.   The patient is in SICU with the chief complaint and diagnosis mentioned in the note.   The plan will be specified in the note.  85yo M with Hx HTN, HLD s/p L chava hole and drain placement. SICU consulted for q1h neuro checks.   follows commands  HEENT: drain in place,   CHEST/LUNG: clear  HEART: Regular   ABDOMEN: Soft, Nontender  PLAN:  Neuro: L SDH s/p drain placement  - q1h neuro checks  - Keppra for seizure ppx  - Respiratory:   - Monitor respiratory status  Cardiovascular: h/o HTN, HLD  - C/w simvastatin 20mg qD   Gastrointestinal:   - Diet: NPO except meds  Genitourinary/Renal:   - IVF: NS @ 125  Heme: SDH  - VTE ppx:   ID: postsurgical prophylaxis  - periop ancef  Endocrine:   - Trend FS on BMP    Code Status: Full Code  Dispo: SICU
I agree with the detailed interval history, physical, and plan, which I have reviewed and edited where appropriate'; also agree with notes/assessment with my team on service.  I have personally examined the patient.  I was physically present for the key portions of the evaluation and management (E/M) service provided.  I reviewed all the pertinent data.  The patient is a critical care patient with life threatening hemodynamic and metabolic instability in SICU.  The SICU team has a constant risk benefit analyzes discussion and coordinating care with the primary team and all consultants.   The patient is in SICU with the chief complaint and diagnosis mentioned in the note.   The plan will be specified in the note.  85yo M  s/p L chava hole and drain placement. SICU consulted for q1h neuro checks.   follows commands  HEENT: drain in place, dressing c/d/i  CHEST/LUNG: clear  HEART: Regular   ABDOMEN: Soft, Nontender  PLAN:  Neuro: L SDH s/p drain placement  - q2h neuro checks  - Keppra for seizure ppx  Respiratory:   - Monitor respiratory status  Cardiovascular:  HTN, HLD  -amlodipine 5mg qD + IV Metoprolol 5mg q6h for BP control  Gastrointestinal:   - Diet: Regular  Genitourinary/Renal:   - IVLocked  Heme: SDH  - VTE ppx: chemical ppx   ID: no active issues   - Trend WBC   Endocrine:  - Trend FS on BMP  Code Status: Full Code  Dispo: SICU
I agree with the detailed interval history, physical, and plan, which I have reviewed and edited where appropriate'; also agree with notes/assessment with my team on service.  I have personally examined the patient.  I was physically present for the key portions of the evaluation and management (E/M) service provided.  I reviewed all the pertinent data.  The patient is a critical care patient with life threatening hemodynamic and metabolic instability in SICU.  The SICU team has a constant risk benefit analyzes discussion and coordinating care with the primary team and all consultants.   The patient is in SICU with the chief complaint and diagnosis mentioned in the note.   The plan will be specified in the note.  87yo M with Hx HTN, HLD with a 2.4cm L SDH with associated 0.4cm L subfalcine shift in SICU for q1h neuro checks.   Exam: awake, alert, oriented. No focal deficits.   RESPIRATORY  clear  RR: RR: 13   CARDIOVASCULAR  Perfusion: adequate  RR  PLAN:  Neuro: L SDH  - q1h neuro checks  - Keppra for seizure ppx  Respiratory:   - Monitor respiratory status  Cardiovascular:  HTN, HLD   amlodipine 5mg qD + IV Metoprolol 5mg q6h for BP control  Gastrointestinal:   - Diet: NPO except meds  Genitourinary/Renal: h/o prostate CA,   - IVF: NS @ 125  - salt tabs  Heme: SDH  - Trend H/H on CBC qD  ID:   - Trend WBC on CBC qD  Endocrine:   - Trend FS on BMP  Code Status: Full Code  Dispo: SICU

## 2022-02-25 NOTE — PROGRESS NOTE ADULT - SUBJECTIVE AND OBJECTIVE BOX
SICU Daily Progress Note  =====================================================  Interval events:   - Q2 neuro checks  - no acute events overnight    87yo M with Hx HTN, HLD, prostate CA, BCC, and SCC who presented with HA and OP imaging demonstrating a L SDH. Patient had fallen in January with (+) HS. Imaging and workup at [facility] was negative for acute injury and intracranial pathology at that time. After discharge, patient continued to have L occipital and posterior neck pain and presented to an OP neurologist, Dr. Morgan, who scheduled him for an OP MRI. MRI on 2/21 demonstrated a 2.4cm L SDH extending through the frontal, parietal, temporal, and occipital regions with compression of L frontal and parietal lobes and L lateral ventricle and associated 0.4cm L subfalcine shift. Patient was sent to ED by Dr. Morgan for NSGY evaluation. SICU consulted for q1h neuro checks iso large L SDH with midline shift    Allergies: No Known Allergies    MEDICATIONS  (STANDING):  amLODIPine   Tablet 5 milliGRAM(s) Oral daily  heparin   Injectable 5000 Unit(s) SubCutaneous every 8 hours  levETIRAcetam 500 milliGRAM(s) Oral two times a day  simvastatin 20 milliGRAM(s) Oral at bedtime  sodium chloride 1 Gram(s) Oral two times a day  tamsulosin 0.4 milliGRAM(s) Oral at bedtime    MEDICATIONS  (PRN):    ICU Vital Signs Last 24 Hrs  T(C): 36.6 (25 Feb 2022 00:00), Max: 37.2 (24 Feb 2022 08:00)  T(F): 97.9 (25 Feb 2022 00:00), Max: 98.9 (24 Feb 2022 08:00)  HR: 68 (25 Feb 2022 02:00) (68 - 92)  BP: 106/63 (25 Feb 2022 02:00) (106/63 - 139/75)  BP(mean): 71 (25 Feb 2022 02:00) (61 - 95)  ABP: --  ABP(mean): --  RR: 14 (25 Feb 2022 02:00) (13 - 24)  SpO2: 100% (25 Feb 2022 02:00) (99% - 100%)    I&O's Detail    23 Feb 2022 07:01  -  24 Feb 2022 07:00  --------------------------------------------------------  IN:    IV PiggyBack: 300 mL    Oral Fluid: 175 mL    sodium chloride 0.9%: 1000 mL    sodium chloride 0.9%: 975 mL  Total IN: 2450 mL    OUT:    Bulb (mL): 160 mL    Voided (mL): 1000 mL  Total OUT: 1160 mL    Total NET: 1290 mL      24 Feb 2022 07:01  -  25 Feb 2022 03:12  --------------------------------------------------------  IN:  Total IN: 0 mL    OUT:    Bulb (mL): 60 mL    Voided (mL): 700 mL  Total OUT: 760 mL    Total NET: -760 mL      --------------------------------------------------------------------------------------    PHYSICAL EXAM:  GENERAL: NAD, follows commands  HEENT: drain in place, dressing c/d/i  CHEST/LUNG: equal chest rise  HEART: Regular rate and rhythm  ABDOMEN: Soft, Nontender, Nondistended  EXTREMITIES:  No clubbing, cyanosis, or edema

## 2022-02-26 ENCOUNTER — TRANSCRIPTION ENCOUNTER (OUTPATIENT)
Age: 87
End: 2022-02-26

## 2022-02-26 VITALS
DIASTOLIC BLOOD PRESSURE: 87 MMHG | RESPIRATION RATE: 18 BRPM | HEART RATE: 78 BPM | OXYGEN SATURATION: 100 % | SYSTOLIC BLOOD PRESSURE: 118 MMHG | TEMPERATURE: 98 F

## 2022-02-26 RX ORDER — ACETAMINOPHEN 500 MG
3 TABLET ORAL
Qty: 0 | Refills: 0 | DISCHARGE
Start: 2022-02-26

## 2022-02-26 RX ORDER — LEVETIRACETAM 250 MG/1
1 TABLET, FILM COATED ORAL
Qty: 14 | Refills: 0
Start: 2022-02-26 | End: 2022-03-04

## 2022-02-26 RX ADMIN — HEPARIN SODIUM 5000 UNIT(S): 5000 INJECTION INTRAVENOUS; SUBCUTANEOUS at 06:11

## 2022-02-26 RX ADMIN — SODIUM CHLORIDE 1 GRAM(S): 9 INJECTION INTRAMUSCULAR; INTRAVENOUS; SUBCUTANEOUS at 06:11

## 2022-02-26 RX ADMIN — LEVETIRACETAM 500 MILLIGRAM(S): 250 TABLET, FILM COATED ORAL at 06:11

## 2022-02-26 RX ADMIN — HEPARIN SODIUM 5000 UNIT(S): 5000 INJECTION INTRAVENOUS; SUBCUTANEOUS at 14:22

## 2022-02-26 NOTE — PROGRESS NOTE ADULT - PROBLEM SELECTOR PLAN 3
Neurologic checks Q1H  Monitor drain output  Follow up postop CT official report  Advance diet as tolerated  PT consult
Neurologic checks Q4H  Monitor drain output  PT consult
Continue simvastatin  DASH diet when PO intake resumes
Neurologic checks Q1H  Monitor drain output  Postop CT this evening  Advance diet after CT  PT consult
Neurologic checks Q4H  Monitor drain output  PT consult

## 2022-02-26 NOTE — PROGRESS NOTE ADULT - PROVIDER SPECIALTY LIST ADULT
Cardiology
Cardiology
SICU
Cardiology
Cardiology
SICU
SICU
Neurosurgery
SICU
Neurosurgery

## 2022-02-26 NOTE — DISCHARGE NOTE NURSING/CASE MANAGEMENT/SOCIAL WORK - PATIENT PORTAL LINK FT
You can access the FollowMyHealth Patient Portal offered by [facility] by registering at the following website: [email or web address]. By joining [employer]’s FollowMyHealth portal, you will also be able to view your health information using other applications (apps) compatible with our system.

## 2022-02-26 NOTE — PROGRESS NOTE ADULT - SUBJECTIVE AND OBJECTIVE BOX
DATE OF SERVICE: 02-26-22 @ 10:22    Subjective: Patient seen and examined. No new events except as noted.     SUBJECTIVE/ROS:  No chest pain, dyspnea, palpitation, or dizziness.       MEDICATIONS:  MEDICATIONS  (STANDING):  amLODIPine   Tablet 5 milliGRAM(s) Oral daily  heparin   Injectable 5000 Unit(s) SubCutaneous every 8 hours  levETIRAcetam 500 milliGRAM(s) Oral two times a day  simvastatin 20 milliGRAM(s) Oral at bedtime  sodium chloride 1 Gram(s) Oral two times a day  tamsulosin 0.4 milliGRAM(s) Oral at bedtime      PHYSICAL EXAM:  T(C): 36.6 (02-26-22 @ 10:00), Max: 36.9 (02-26-22 @ 05:45)  HR: 78 (02-26-22 @ 10:00) (67 - 89)  BP: 118/87 (02-26-22 @ 10:00) (107/58 - 164/84)  RR: 18 (02-26-22 @ 10:00) (12 - 18)  SpO2: 100% (02-26-22 @ 10:00) (98% - 100%)  Wt(kg): --  I&O's Summary    25 Feb 2022 07:01  -  26 Feb 2022 07:00  --------------------------------------------------------  IN: 300 mL / OUT: 950 mL / NET: -650 mL            JVP: Normal  Neck: supple  Lung: clear   CV: S1 S2 , Murmur:  Abd: soft  Ext: No edema  neuro: Awake / alert  Psych: flat affect  Skin: normal``    LABS/DATA:    CARDIAC MARKERS:                                11.7   11.59 )-----------( 234      ( 25 Feb 2022 06:21 )             34.6     02-25    142  |  110<H>  |  22  ----------------------------<  97  4.1   |  21<L>  |  1.65<H>    Ca    8.6      25 Feb 2022 06:21  Phos  2.4     02-25  Mg     2.10     02-25      proBNP:   Lipid Profile:   HgA1c:   TSH:     TELE:  EKG:

## 2022-02-26 NOTE — PROGRESS NOTE ADULT - PROBLEM SELECTOR PLAN 1
Monitor BP  Continue amlodipine
Preop for today  Keep NPO  continue keppra

## 2022-02-26 NOTE — PROGRESS NOTE ADULT - ASSESSMENT
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 Patient is preop for chava hole evacuation today. Cardiology has cleared patient for the planned procedure    2/23: Postop left chava holes, evacuation of SDH. Patient intact and symptomatically improved. DANE X 1 in place. Postop CT pending  2/24: POD # 1 S/P left chava holes, evacuation of SDH. Patient intact and symptomatically improved. DANE X 1 in place. Postop CT performed, official report pending  2/25: POD # 2 S/P left chava holes, evacuation of SDH. Patient intact and symptomatically improved. DANE X 1 in place.   2/26: POD #3, drain out, home today

## 2022-02-26 NOTE — DISCHARGE NOTE PROVIDER - NSDCFUADDINST_GEN_ALL_CORE_FT
- You had surgery on 2/23/22. The surgery you had was two left burrholes for subdural hematoma evacuation.    - Incision site does not need a bandage or ointment on it. Do not touch incision.     - Shower daily with shampoo/soap on post operative day 4 (2/27/22). Avoid long soaks and do not submerge incision in water (no baths). Allow soap and water to run over the incision. Pat incision area dry with clean towel- do not scrub. Please shower regularly to ensure incision stays clean to avoid post operative infections.     - Notify your surgeon if you notice increased redness, drainage or your incision area opening.     - Return to ER immediately for high fevers, severe headache, vomiting, lethargy or weakness.    - Please call your neurosurgeon following discharge to make follow up appointment in 1 week after discharge unless otherwise specified. See contact information.    - Prescription post operative medication has been sent to [facility] in the hospital. All post operative prescriptions should be picked up before departing the hospital. You can also take over the counter Tylenol for pain as needed.     - Ambulate as tolerated. Continue with all "activities of daily living". Avoid strenuous activity or heavy lifting until cleared for additional activity at your follow up appointment. You cannot drive while taking narcotics (oxy, valium, etc.)     - Do not return to work or school until cleared by your neurosurgeon at your follow up visit unless specified to you during your hospital stay.    - Do not pick or scratch at incision. You have staples that will be removed in the office at your follow up appointment.    - Do not take any blood thinning medications such as aspirin, motrin, ibuprofen, warfarin, coumadin, plavix, heparin, lovenox, etc. until cleared by your neurosurgeon.    - You have been started on a new medication, keppra.  Keppra (Levetiracetam) helps control and prevent seizures.  The most common side effects include diarrhea or constipation, excessive sleepiness, irritability and mood changes.  Rare and sometimes serious side effects include rash. Please inform your doctor if you experience any side effects.

## 2022-02-26 NOTE — DISCHARGE NOTE PROVIDER - HOSPITAL COURSE
86M pmhx htn, hld, prostate Ca, BCC, SCC follows with Oncologist at [facility] Dr. Dobbins per daughter was last seen by oncologist last year and is in remission and was advised to f/u in 1 year. Patient fell 1 month ago onto concrete was taken to [facility] at that time and dc home. He has been having occipital Ha x 3 weeks and an oupt mri head and C/s  was done by neurologist Dr. Clayton today. The mri showed a large subacute on chronic SDH left side with shift and patient was advised to come to ED. Patient denies any weakness, nausea or vomiting reports Ashraf is constant 7/10. Patient is preop for chava hole evacuation. Cardiology has cleared patient for the planned procedure.    2/23: Postop left chava holes, evacuation of SDH. Patient intact and symptomatically improved. DANE X 1 in place. Postop CT pending  2/24: POD # 1 S/P left chava holes, evacuation of SDH. Patient intact and symptomatically improved. DANE X 1 in place. Postop CT performed, official report pending  2/25: POD # 2 S/P left chava holes, evacuation of SDH. Patient intact and symptomatically improved. DANE X 1 in place.   2/26: POD #3, drain out, home today

## 2022-02-26 NOTE — DISCHARGE NOTE NURSING/CASE MANAGEMENT/SOCIAL WORK - NSDCPEFALRISK_GEN_ALL_CORE
For information on Fall & Injury Prevention, visit: [email or web address] OR  [email or web address] OR  https://www.cdc.gov/steadi/patient.html

## 2022-02-26 NOTE — PROGRESS NOTE ADULT - PROBLEM SELECTOR PROBLEM 2
HLD (hyperlipidemia)
HTN (hypertension)
HLD (hyperlipidemia)

## 2022-02-26 NOTE — DISCHARGE NOTE PROVIDER - NSDCMRMEDTOKEN_GEN_ALL_CORE_FT
acetaminophen 325 mg oral tablet: 3 tab(s) orally every 6 hours, As needed, Mild Pain (1 - 3)  amLODIPine 5 mg oral tablet: 1 tab(s) orally once a day  levETIRAcetam 500 mg oral tablet: 1 tab(s) orally 2 times a day  simvastatin 20 mg oral tablet: 1 tab(s) orally once a day (at bedtime)  tamsulosin 0.4 mg oral capsule: 1 cap(s) orally once a day

## 2022-02-26 NOTE — PROGRESS NOTE ADULT - ASSESSMENT
SDH  s/p drain   fu with nsx    Fall vs Syncope   unremarkable echo   carotid doppler recommended can be done as outpt if not done while inpt     HTN  stable  cont current meds

## 2022-02-26 NOTE — DISCHARGE NOTE PROVIDER - NSDCCPCAREPLAN_GEN_ALL_CORE_FT
PRINCIPAL DISCHARGE DIAGNOSIS  Diagnosis: Subdural hematoma  Assessment and Plan of Treatment:       
none

## 2022-02-26 NOTE — DISCHARGE NOTE NURSING/CASE MANAGEMENT/SOCIAL WORK - NSDCPNINST_GEN_ALL_CORE
Please return to ED if you develop any nausea, vomiting, diarrhea or temp of 100.4 and greater. Notify your provider if you develop any pus like drainage from incision sites or worsening pain unrelieved with ordered pain meds.

## 2022-02-26 NOTE — DISCHARGE NOTE PROVIDER - CARE PROVIDER_API CALL
Bret Goss)  Neurosurgery; Pediatric Neurosurgery  [address] [place] [address], [address]  [place], [place] [id number]  Phone: [phone]  Fax: [phone]  Follow Up Time: 1 week

## 2022-02-26 NOTE — PROGRESS NOTE ADULT - PROBLEM SELECTOR PLAN 2
Continue simvastatin  DASH diet
Continue simvastatin  DASH diet
Monitor BP  Continue amlodipine
Continue simvastatin  DASH diet when PO intake resumes
Continue simvastatin  DASH diet when PO intake resumes

## 2022-02-26 NOTE — PROGRESS NOTE ADULT - SUBJECTIVE AND OBJECTIVE BOX
No issues overnight      AAO X 3  Pupils surgical, uneven  EOMI  CN 2-12 otherwise intact  LEMUS strength 5/5  No drift  SILT    DANE: 60cc    MEDICATIONS  (STANDING):  amLODIPine   Tablet 5 milliGRAM(s) Oral daily  heparin   Injectable 5000 Unit(s) SubCutaneous every 8 hours  levETIRAcetam 500 milliGRAM(s) Oral two times a day  simvastatin 20 milliGRAM(s) Oral at bedtime  sodium chloride 1 Gram(s) Oral two times a day  tamsulosin 0.4 milliGRAM(s) Oral at bedtime    MEDICATIONS  (PRN):                          11.6   8.04  )-----------( 210      ( 23 Feb 2022 07:05 )             34.6     02-23    141  |  111<H>  |  27<H>  ----------------------------<  84  4.2   |  19<L>  |  1.56<H>    Ca    8.0<L>      23 Feb 2022 07:05  Phos  2.9     02-23  Mg     1.90     02-23    < from: CT Head No Cont (02.23.22 @ 21:12) >  Postoperative changes compatible with a left frontal and high left   posterior frontal parietal chava holes are identified. This findings are   new when compared with the prior exam. The high left posterior   frontal/parietal chava hole does demonstrate subdural drain. There has   been evacuation of the previously noted acute on chronic subdural   hematoma. Small residual mixed attenuated subdural hematoma with   associated air is identified. This finding measures approximately 1.1 cm   and the subdural hematoma previously measured approximately 2.6 cm.   Decreased mass effect on the left lateral ventricle is seen. Chronic   subdural hematoma involving the right frontal region is again seen and   unchanged. This finding measures approximately 1.3 cm widest diameter and   previously measured approximately 0.4 cm in widest diameter. Evaluation   of osseous structures with the appropriatewindow aside from postop   changes appear normal    Extracalvarial soft tissue swelling and stables are seen in the   postoperative region.    IMPRESSION: Postop changes as described above.    < end of copied text >

## 2022-03-10 PROBLEM — I10 ESSENTIAL (PRIMARY) HYPERTENSION: Chronic | Status: ACTIVE | Noted: 2022-02-22

## 2022-03-10 PROBLEM — E78.5 HYPERLIPIDEMIA, UNSPECIFIED: Chronic | Status: ACTIVE | Noted: 2022-02-22

## 2022-03-10 PROBLEM — C61 MALIGNANT NEOPLASM OF PROSTATE: Chronic | Status: ACTIVE | Noted: 2022-02-22

## 2022-03-14 ENCOUNTER — APPOINTMENT (OUTPATIENT)
Dept: CT IMAGING | Facility: CLINIC | Age: 87
End: 2022-03-14
Payer: MEDICARE

## 2022-03-14 ENCOUNTER — OUTPATIENT (OUTPATIENT)
Dept: OUTPATIENT SERVICES | Facility: HOSPITAL | Age: 87
LOS: 1 days | End: 2022-03-14
Payer: COMMERCIAL

## 2022-03-14 DIAGNOSIS — S06.5X9A TRAUMATIC SUBDURAL HEMORRHAGE WITH LOSS OF CONSCIOUSNESS OF UNSPECIFIED DURATION, INITIAL ENCOUNTER: ICD-10-CM

## 2022-03-14 DIAGNOSIS — Z00.8 ENCOUNTER FOR OTHER GENERAL EXAMINATION: ICD-10-CM

## 2022-03-14 PROCEDURE — 70450 CT HEAD/BRAIN W/O DYE: CPT | Mod: 26

## 2022-03-14 PROCEDURE — 70450 CT HEAD/BRAIN W/O DYE: CPT

## 2022-04-27 ENCOUNTER — OUTPATIENT (OUTPATIENT)
Dept: OUTPATIENT SERVICES | Facility: HOSPITAL | Age: 87
LOS: 1 days | End: 2022-04-27
Payer: COMMERCIAL

## 2022-04-27 ENCOUNTER — APPOINTMENT (OUTPATIENT)
Dept: CT IMAGING | Facility: CLINIC | Age: 87
End: 2022-04-27
Payer: MEDICARE

## 2022-04-27 DIAGNOSIS — S06.5X9A TRAUMATIC SUBDURAL HEMORRHAGE WITH LOSS OF CONSCIOUSNESS OF UNSPECIFIED DURATION, INITIAL ENCOUNTER: ICD-10-CM

## 2022-04-27 DIAGNOSIS — Z00.8 ENCOUNTER FOR OTHER GENERAL EXAMINATION: ICD-10-CM

## 2022-04-27 PROCEDURE — 70450 CT HEAD/BRAIN W/O DYE: CPT

## 2022-04-27 PROCEDURE — 70450 CT HEAD/BRAIN W/O DYE: CPT | Mod: 26

## 2022-12-12 ENCOUNTER — OFFICE (OUTPATIENT)
Dept: URBAN - METROPOLITAN AREA CLINIC 93 | Facility: CLINIC | Age: 87
Setting detail: OPHTHALMOLOGY
End: 2022-12-12
Payer: MEDICARE

## 2022-12-12 DIAGNOSIS — H16.222: ICD-10-CM

## 2022-12-12 DIAGNOSIS — H27.112: ICD-10-CM

## 2022-12-12 DIAGNOSIS — H43.812: ICD-10-CM

## 2022-12-12 PROCEDURE — 99213 OFFICE O/P EST LOW 20 MIN: CPT | Performed by: OPHTHALMOLOGY

## 2022-12-12 ASSESSMENT — KERATOMETRY
OD_K2POWER_DIOPTERS: 47.50
OD_K1POWER_DIOPTERS: 46.00
OS_K1POWER_DIOPTERS: 42.00
OS_K2POWER_DIOPTERS: 46.75
OD_AXISANGLE_DEGREES: 23
OS_AXISANGLE_DEGREES: 169

## 2022-12-12 ASSESSMENT — PUNCTA - ASSESSMENT: OS_PUNCTA: SIL PLUG LLL LUL

## 2022-12-12 ASSESSMENT — VISUAL ACUITY
OS_BCVA: 20/30-2
OD_BCVA: 20/HM

## 2022-12-12 ASSESSMENT — CONFRONTATIONAL VISUAL FIELD TEST (CVF)
OD_FINDINGS: FULL
OS_FINDINGS: FULL

## 2022-12-12 ASSESSMENT — REFRACTION_AUTOREFRACTION
OS_SPHERE: UNABLE
OD_CYLINDER: 0-1.50
OD_SPHERE: +1.50
OD_AXIS: 98

## 2022-12-12 ASSESSMENT — CORNEAL EDEMA CLINICAL DESCRIPTION: OS_CORNEALEDEMA: 1+

## 2022-12-12 ASSESSMENT — SUPERFICIAL PUNCTATE KERATITIS (SPK): OS_SPK: 4+

## 2023-05-04 ENCOUNTER — APPOINTMENT (OUTPATIENT)
Dept: UROLOGY | Facility: CLINIC | Age: 88
End: 2023-05-04
Payer: MEDICARE

## 2023-05-04 VITALS
WEIGHT: 146 LBS | TEMPERATURE: 98.2 F | HEART RATE: 62 BPM | OXYGEN SATURATION: 98 % | DIASTOLIC BLOOD PRESSURE: 68 MMHG | BODY MASS INDEX: 23.46 KG/M2 | HEIGHT: 66 IN | SYSTOLIC BLOOD PRESSURE: 140 MMHG

## 2023-05-04 DIAGNOSIS — Z80.0 FAMILY HISTORY OF MALIGNANT NEOPLASM OF DIGESTIVE ORGANS: ICD-10-CM

## 2023-05-04 DIAGNOSIS — Z63.5 DISRUPTION OF FAMILY BY SEPARATION AND DIVORCE: ICD-10-CM

## 2023-05-04 LAB
BILIRUB UR QL STRIP: NEGATIVE
CLARITY UR: CLEAR
COLLECTION METHOD: NORMAL
GLUCOSE UR-MCNC: NEGATIVE
HCG UR QL: 0.2 EU/DL
HGB UR QL STRIP.AUTO: NEGATIVE
KETONES UR-MCNC: NEGATIVE
LEUKOCYTE ESTERASE UR QL STRIP: NEGATIVE
NITRITE UR QL STRIP: NEGATIVE
PH UR STRIP: 5.5
PROT UR STRIP-MCNC: 100
SP GR UR STRIP: 1.02

## 2023-05-04 PROCEDURE — 81003 URINALYSIS AUTO W/O SCOPE: CPT | Mod: QW

## 2023-05-04 PROCEDURE — 51798 US URINE CAPACITY MEASURE: CPT

## 2023-05-04 PROCEDURE — 99204 OFFICE O/P NEW MOD 45 MIN: CPT | Mod: 25

## 2023-05-04 RX ORDER — SIMVASTATIN 20 MG/1
20 TABLET, FILM COATED ORAL
Refills: 0 | Status: ACTIVE | COMMUNITY

## 2023-05-04 RX ORDER — LOSARTAN POTASSIUM 50 MG/1
50 TABLET, FILM COATED ORAL
Refills: 0 | Status: ACTIVE | COMMUNITY

## 2023-05-04 RX ORDER — UBIDECARENONE/VIT E ACET 100MG-5
CAPSULE ORAL
Refills: 0 | Status: ACTIVE | COMMUNITY

## 2023-05-04 RX ORDER — AMLODIPINE BESYLATE 5 MG/1
5 TABLET ORAL
Refills: 0 | Status: ACTIVE | COMMUNITY

## 2023-05-04 RX ORDER — PNV NO.95/FERROUS FUM/FOLIC AC 28MG-0.8MG
TABLET ORAL
Refills: 0 | Status: ACTIVE | COMMUNITY

## 2023-05-04 SDOH — SOCIAL STABILITY - SOCIAL INSECURITY: DISRUPTION OF FAMILY BY SEPARATION AND DIVORCE: Z63.5

## 2024-05-06 ENCOUNTER — APPOINTMENT (OUTPATIENT)
Dept: UROLOGY | Facility: CLINIC | Age: 89
End: 2024-05-06
Payer: MEDICARE

## 2024-05-06 VITALS
HEIGHT: 66 IN | DIASTOLIC BLOOD PRESSURE: 90 MMHG | OXYGEN SATURATION: 99 % | HEART RATE: 74 BPM | BODY MASS INDEX: 23.46 KG/M2 | RESPIRATION RATE: 16 BRPM | SYSTOLIC BLOOD PRESSURE: 165 MMHG | WEIGHT: 146 LBS

## 2024-05-06 DIAGNOSIS — Z87.438 PERSONAL HISTORY OF OTHER DISEASES OF MALE GENITAL ORGANS: ICD-10-CM

## 2024-05-06 DIAGNOSIS — R80.9 PROTEINURIA, UNSPECIFIED: ICD-10-CM

## 2024-05-06 PROCEDURE — 51798 US URINE CAPACITY MEASURE: CPT

## 2024-05-06 PROCEDURE — 51741 ELECTRO-UROFLOWMETRY FIRST: CPT

## 2024-05-06 PROCEDURE — G2211 COMPLEX E/M VISIT ADD ON: CPT

## 2024-05-06 PROCEDURE — 99214 OFFICE O/P EST MOD 30 MIN: CPT

## 2024-05-06 NOTE — ASSESSMENT
[FreeTextEntry1] : 88 y/o with mild dementia. history on prostate cancer on Surveillance. PSA   Normal PVR continue Tamsulosin Avoid NSAIDs follow up one year

## 2024-05-06 NOTE — ASSESSMENT
[FreeTextEntry1] : 88-year-old gentleman with a history of prostate cancer on surveillance PSA. PVR 0 uroflow low flow and low volume.  PSA will be done today for continued prostate cancer surveillance and he will be notified of the results.   Recommending stopping tamsulosin and following up in 3 months to reassess voiding and PVR Advised consultation with a geriatrician to fully evaluate degree of dementia Follow-up 1 year.

## 2024-05-06 NOTE — HISTORY OF PRESENT ILLNESS
[FreeTextEntry1] : SEBASTIÁN ROACH  88 year M presents for prostate health.Contiued care with me from Dr. Dobbins of [facility]. Patient with history of Prostate cancer on surveillance   PSA 03/23 1..82. Proteinuria and Elevate Cr. 1.9 Reports normal voiding  Tamsulosin nightly   S/p Brain surgery for hematoma evacuation in the past.

## 2024-05-06 NOTE — HISTORY OF PRESENT ILLNESS
[FreeTextEntry1] : 88-year-old gentleman with a history of prostate cancer on current surveillance last PSA in 2023 was within normal limits.  Currently on tamsulosin 1 tablet nightly doing well Currently lives alone daughters visit every 2 days and reports falls.  They are in the process of obtaining advised to start  care. Patient with mild dementia history of brain surgery with clot evacuation.  Clot evacuation. Accompanied by his daughter.  [Urinary Incontinence] : no urinary incontinence [Urinary Retention] : no urinary retention [Urinary Urgency] : no urinary urgency [Urinary Frequency] : no urinary frequency

## 2024-05-06 NOTE — PHYSICAL EXAM
[General Appearance - Well Developed] : well developed [General Appearance - Well Nourished] : well nourished [Normal Appearance] : normal appearance [Well Groomed] : well groomed [General Appearance - In No Acute Distress] : no acute distress [Edema] : no peripheral edema [Respiration, Rhythm And Depth] : normal respiratory rhythm and effort [Exaggerated Use Of Accessory Muscles For Inspiration] : no accessory muscle use [Abdomen Soft] : soft [Abdomen Tenderness] : non-tender [Costovertebral Angle Tenderness] : no ~M costovertebral angle tenderness [Urethral Meatus] : meatus normal [Urinary Bladder Findings] : the bladder was normal on palpation [Scrotum] : the scrotum was normal [Testes Mass (___cm)] : there were no testicular masses [Prostate Tenderness] : the prostate was not tender [No Prostate Nodules] : no prostate nodules [Prostate Size ___ gm] : prostate size [unfilled] gm [Normal Station and Gait] : the gait and station were normal for the patient's age [] : no rash [No Focal Deficits] : no focal deficits [Oriented To Time, Place, And Person] : oriented to person, place, and time [Affect] : the affect was normal [Mood] : the mood was normal [Not Anxious] : not anxious [No Palpable Adenopathy] : no palpable adenopathy [FreeTextEntry1] : Both breast examined. No lumps/ nodules noted. Normal nipples without discharge.

## 2024-05-06 NOTE — PHYSICAL EXAM
[Normal Appearance] : normal appearance [Well Groomed] : well groomed [General Appearance - In No Acute Distress] : no acute distress [Edema] : no peripheral edema [Respiration, Rhythm And Depth] : normal respiratory rhythm and effort [Exaggerated Use Of Accessory Muscles For Inspiration] : no accessory muscle use [Abdomen Soft] : soft [Costovertebral Angle Tenderness] : no ~M costovertebral angle tenderness [Abdomen Tenderness] : non-tender [Urinary Bladder Findings] : the bladder was normal on palpation [Normal Station and Gait] : the gait and station were normal for the patient's age [] : no rash [No Focal Deficits] : no focal deficits [Oriented To Time, Place, And Person] : oriented to person, place, and time [Affect] : the affect was normal [Mood] : the mood was normal [No Palpable Adenopathy] : no palpable adenopathy [Urethral Meatus] : meatus normal [Penis Abnormality] : normal uncircumcised penis [Scrotum] : the scrotum was normal [Epididymis] : the epididymides were normal [Testes Tenderness] : no tenderness of the testes [Testes Mass (___cm)] : there were no testicular masses

## 2024-05-10 LAB
PSA FREE FLD-MCNC: 32 %
PSA FREE SERPL-MCNC: 0.91 NG/ML
PSA SERPL-MCNC: 2.86 NG/ML

## 2024-06-05 ENCOUNTER — NON-APPOINTMENT (OUTPATIENT)
Age: 89
End: 2024-06-05

## 2024-06-05 ENCOUNTER — APPOINTMENT (OUTPATIENT)
Dept: CARDIOLOGY | Facility: CLINIC | Age: 89
End: 2024-06-05
Payer: MEDICARE

## 2024-06-05 VITALS
HEIGHT: 62 IN | WEIGHT: 139.2 LBS | BODY MASS INDEX: 25.62 KG/M2 | SYSTOLIC BLOOD PRESSURE: 130 MMHG | TEMPERATURE: 98.7 F | DIASTOLIC BLOOD PRESSURE: 67 MMHG | HEART RATE: 73 BPM | OXYGEN SATURATION: 96 %

## 2024-06-05 PROCEDURE — G2211 COMPLEX E/M VISIT ADD ON: CPT

## 2024-06-05 PROCEDURE — 99215 OFFICE O/P EST HI 40 MIN: CPT

## 2024-06-05 PROCEDURE — 93000 ELECTROCARDIOGRAM COMPLETE: CPT

## 2024-06-05 NOTE — HISTORY OF PRESENT ILLNESS
[FreeTextEntry1] : 88M w HTN HLD DM presents to establish care Sent in by PMD: Dr Eric Strauss [place]  pts daughter here for complete encounter  pt has never had cv eval  pt feeling well, no complaints. denies CP, SOB, at rest or on exertion. Denies palpitations, dizziness, diaphoresis, syncope, LE edema, orthopnea. no recent falls  Exercise: occasional walking Diet: none  Prev cardiac history: none Previous cardiac testing: none Recent labs:  EKG: SR 86  Med hx: HTN HLD DM, prostate CA (not treating for now).  hx of SDH after a fall Sx hx: hernia cataract, surgery after his SDH to decompress. 	 Family hx: no known cardiac hx Social hx:  from [place], now lives in [place], alone. retired . no tob/etoh/drugs Meds: norvacs 5 losartan 50zocor tamsulosin  Allergies: nkda

## 2024-06-05 NOTE — DISCUSSION/SUMMARY
[FreeTextEntry1] : 88M w HTN HLD DM presents to establish care  pt feeling well  euvolemic multiple risk factors for cv dz, has never had a workup will check tte to eval lv function, valves for health maintenance, will check carotid duplex and AAA study  f/u 6 months or sooner as needed  50 min spent on complete encounter.    [EKG obtained to assist in diagnosis and management of assessed problem(s)] : EKG obtained to assist in diagnosis and management of assessed problem(s)

## 2024-06-12 ENCOUNTER — APPOINTMENT (OUTPATIENT)
Dept: UROLOGY | Facility: CLINIC | Age: 89
End: 2024-06-12

## 2024-07-02 ENCOUNTER — NON-APPOINTMENT (OUTPATIENT)
Age: 89
End: 2024-07-02

## 2024-07-02 ENCOUNTER — APPOINTMENT (OUTPATIENT)
Dept: GERIATRICS | Facility: CLINIC | Age: 89
End: 2024-07-02
Payer: MEDICARE

## 2024-07-02 VITALS
HEIGHT: 62 IN | SYSTOLIC BLOOD PRESSURE: 127 MMHG | WEIGHT: 139 LBS | BODY MASS INDEX: 25.58 KG/M2 | DIASTOLIC BLOOD PRESSURE: 70 MMHG | OXYGEN SATURATION: 98 % | HEART RATE: 66 BPM | TEMPERATURE: 98.6 F | RESPIRATION RATE: 14 BRPM

## 2024-07-02 DIAGNOSIS — Z85.46 PERSONAL HISTORY OF MALIGNANT NEOPLASM OF PROSTATE: ICD-10-CM

## 2024-07-02 DIAGNOSIS — E55.9 VITAMIN D DEFICIENCY, UNSPECIFIED: ICD-10-CM

## 2024-07-02 DIAGNOSIS — R41.89 OTHER SYMPTOMS AND SIGNS INVOLVING COGNITIVE FUNCTIONS AND AWARENESS: ICD-10-CM

## 2024-07-02 DIAGNOSIS — M79.671 PAIN IN RIGHT FOOT: ICD-10-CM

## 2024-07-02 DIAGNOSIS — F32.A DEPRESSION, UNSPECIFIED: ICD-10-CM

## 2024-07-02 DIAGNOSIS — N18.30 CHRONIC KIDNEY DISEASE, STAGE 3 UNSPECIFIED: ICD-10-CM

## 2024-07-02 DIAGNOSIS — E78.49 OTHER HYPERLIPIDEMIA: ICD-10-CM

## 2024-07-02 DIAGNOSIS — E53.8 DEFICIENCY OF OTHER SPECIFIED B GROUP VITAMINS: ICD-10-CM

## 2024-07-02 DIAGNOSIS — I10 ESSENTIAL (PRIMARY) HYPERTENSION: ICD-10-CM

## 2024-07-02 PROCEDURE — 90677 PCV20 VACCINE IM: CPT

## 2024-07-02 PROCEDURE — G0009: CPT

## 2024-07-02 PROCEDURE — 99205 OFFICE O/P NEW HI 60 MIN: CPT | Mod: 25

## 2024-07-02 RX ORDER — DONEPEZIL HYDROCHLORIDE 5 MG/1
5 TABLET ORAL DAILY
Qty: 30 | Refills: 1 | Status: ACTIVE | COMMUNITY
Start: 2024-07-02 | End: 1900-01-01

## 2024-07-02 RX ORDER — ESCITALOPRAM OXALATE 5 MG/1
5 TABLET ORAL DAILY
Qty: 30 | Refills: 2 | Status: ACTIVE | COMMUNITY
Start: 2024-07-02 | End: 1900-01-01

## 2024-07-15 ENCOUNTER — NON-APPOINTMENT (OUTPATIENT)
Age: 89
End: 2024-07-15

## 2024-07-22 LAB
25(OH)D3 SERPL-MCNC: 29.9 NG/ML
ALBUMIN SERPL ELPH-MCNC: 4.2 G/DL
ALP BLD-CCNC: 70 U/L
ALT SERPL-CCNC: 13 U/L
ANION GAP SERPL CALC-SCNC: 10 MMOL/L
AST SERPL-CCNC: 14 U/L
BILIRUB SERPL-MCNC: 0.4 MG/DL
BUN SERPL-MCNC: 24 MG/DL
CALCIUM SERPL-MCNC: 8.8 MG/DL
CHLORIDE SERPL-SCNC: 108 MMOL/L
CHOLEST SERPL-MCNC: 137 MG/DL
CO2 SERPL-SCNC: 22 MMOL/L
CREAT SERPL-MCNC: 2.45 MG/DL
EGFR: 25 ML/MIN/1.73M2
GLUCOSE SERPL-MCNC: 103 MG/DL
HCT VFR BLD CALC: 34.3 %
HDLC SERPL-MCNC: 45 MG/DL
HGB BLD-MCNC: 11.4 G/DL
LDLC SERPL CALC-MCNC: 78 MG/DL
MCHC RBC-ENTMCNC: 32.3 PG
MCHC RBC-ENTMCNC: 33.2 GM/DL
MCV RBC AUTO: 97.2 FL
NONHDLC SERPL-MCNC: 92 MG/DL
PLATELET # BLD AUTO: 226 K/UL
POTASSIUM SERPL-SCNC: 4.7 MMOL/L
PROT SERPL-MCNC: 6.5 G/DL
RBC # BLD: 3.53 M/UL
RBC # FLD: 12.8 %
SODIUM SERPL-SCNC: 140 MMOL/L
TRIGL SERPL-MCNC: 72 MG/DL
TSH SERPL-ACNC: 1.36 UIU/ML
VIT B12 SERPL-MCNC: 351 PG/ML
WBC # FLD AUTO: 8.08 K/UL

## 2024-08-07 ENCOUNTER — APPOINTMENT (OUTPATIENT)
Dept: UROLOGY | Facility: CLINIC | Age: 89
End: 2024-08-07

## 2024-08-13 ENCOUNTER — APPOINTMENT (OUTPATIENT)
Dept: GERIATRICS | Facility: CLINIC | Age: 89
End: 2024-08-13
Payer: MEDICARE

## 2024-08-13 VITALS
BODY MASS INDEX: 25.4 KG/M2 | OXYGEN SATURATION: 98 % | DIASTOLIC BLOOD PRESSURE: 66 MMHG | SYSTOLIC BLOOD PRESSURE: 140 MMHG | HEART RATE: 55 BPM | WEIGHT: 138 LBS | RESPIRATION RATE: 14 BRPM | TEMPERATURE: 97.3 F | HEIGHT: 62 IN

## 2024-08-13 DIAGNOSIS — F02.A3 ALZHEIMER'S DISEASE WITH LATE ONSET: ICD-10-CM

## 2024-08-13 DIAGNOSIS — N18.30 CHRONIC KIDNEY DISEASE, STAGE 3 UNSPECIFIED: ICD-10-CM

## 2024-08-13 DIAGNOSIS — G30.1 ALZHEIMER'S DISEASE WITH LATE ONSET: ICD-10-CM

## 2024-08-13 DIAGNOSIS — F32.A DEPRESSION, UNSPECIFIED: ICD-10-CM

## 2024-08-13 DIAGNOSIS — I10 ESSENTIAL (PRIMARY) HYPERTENSION: ICD-10-CM

## 2024-08-13 PROCEDURE — 99483 ASSMT & CARE PLN PT COG IMP: CPT

## 2024-08-15 ENCOUNTER — NON-APPOINTMENT (OUTPATIENT)
Age: 89
End: 2024-08-15

## 2024-08-26 ENCOUNTER — RX RENEWAL (OUTPATIENT)
Age: 89
End: 2024-08-26

## 2024-09-09 ENCOUNTER — APPOINTMENT (OUTPATIENT)
Dept: UROLOGY | Facility: CLINIC | Age: 89
End: 2024-09-09
Payer: MEDICARE

## 2024-09-09 VITALS
BODY MASS INDEX: 26.68 KG/M2 | OXYGEN SATURATION: 100 % | SYSTOLIC BLOOD PRESSURE: 128 MMHG | HEIGHT: 62 IN | TEMPERATURE: 97.5 F | RESPIRATION RATE: 18 BRPM | DIASTOLIC BLOOD PRESSURE: 66 MMHG | HEART RATE: 60 BPM | WEIGHT: 145 LBS

## 2024-09-09 DIAGNOSIS — C61 MALIGNANT NEOPLASM OF PROSTATE: ICD-10-CM

## 2024-09-09 DIAGNOSIS — R97.20 ELEVATED PROSTATE, SPECIFIC ANTIGEN [PSA]: ICD-10-CM

## 2024-09-09 PROCEDURE — 99214 OFFICE O/P EST MOD 30 MIN: CPT

## 2024-09-09 NOTE — PHYSICAL EXAM
[Normal Appearance] : normal appearance [Well Groomed] : well groomed [General Appearance - In No Acute Distress] : no acute distress [Edema] : no peripheral edema [Respiration, Rhythm And Depth] : normal respiratory rhythm and effort [Exaggerated Use Of Accessory Muscles For Inspiration] : no accessory muscle use [Abdomen Soft] : soft [Abdomen Tenderness] : non-tender [Costovertebral Angle Tenderness] : no ~M costovertebral angle tenderness [Urinary Bladder Findings] : the bladder was normal on palpation [Scrotum] : the scrotum was normal [Testes Tenderness] : no tenderness of the testes [Rectal Exam - Rectum] : digital rectal exam was normal [Normal Station and Gait] : the gait and station were normal for the patient's age [] : no rash [No Focal Deficits] : no focal deficits [Affect] : the affect was normal [Mood] : the mood was normal [General Appearance - Well Developed] : well developed [General Appearance - Well Nourished] : well nourished [Urethral Meatus] : meatus normal [Penis Abnormality] : normal circumcised penis [Epididymis] : the epididymides were normal [Testes Mass (___cm)] : there were no testicular masses [Prostate Tenderness] : the prostate was not tender [No Prostate Nodules] : no prostate nodules [Skin Color & Pigmentation] : normal skin color and pigmentation [Not Anxious] : not anxious [Chaperone Present] : A chaperone was present in the examining room during all aspects of the physical examination [de-identified] : negative KASSIDY 9/9/2024, mildly enlarged prostate, no nodules [FreeTextEntry2] : Indiana Fuller

## 2024-09-09 NOTE — ASSESSMENT
[FreeTextEntry1] : 88M with unclear history.  Patient thought to have prostate cancer per records though patient and family have no knowledge of this and he denies any history of prostate MRI or biopsy.  Latest PSA from 5/10/2024 was 2.86. No further PSA history documented.   -Obtain medical records from outside health system -[facility] -PSA today -Had extensive discussion regarding PSA screening in light of patient's age.  Discussed that with his unclear history, probably MRI not unreasonable to assess for any lesions concerning for advanced prostate cancer.  Discussed that at his age with his PSA as it is, low likelihood that he has asked advanced prostate cancer though still possible. -Prostate MRI ordered-patient and daughters would like to contemplate whether to pursue MRI  - The patient and I spoke at length about prostate cancer screening, its risks and its benefits. The patient has age as a risk factor for prostate cancer.  He understands that many men with prostate cancer will die with the disease rather than of it, and we also discussed the results large multi-center American and  prostate cancer screening trials. He also understands that PSA in and of itself does not diagnose prostate cancer but only assesses risk to a certain degree. The patient understands that to definitively screen for prostate cancer, a biopsy is required, and this procedure has risks, including bleeding, infection, ED and urinary retention. The patient opted to proceed.          Mita Kiran MD Chief of Urology, [facility] - [address], [address] [place] [address] Phone: [phone] Fax: [phone]

## 2024-09-09 NOTE — HISTORY OF PRESENT ILLNESS
[Currently Experiencing ___] :  [unfilled] [Nocturia] : nocturia [None] : None [FreeTextEntry1] : Referring Provider: Dr. Garland Chief Complaint: Elevated PSA  Date of first visit: 09/09/2024   SEBASTIÁN ROACH is an 88-year-old man accompanied by his daughter with a PMHx of CKD, dementia, depression, elevated PSA, HTN, HLD who presents for evaluation of elevated PSA. Patient has previously seen Dr. Garland, where it was noted that patient has a history of prostate cancer and is currently on active surveillance.  The only PSA available for review in our records is of value of 2.86 drawn May 2024. As per daughter who conferred with her other sister as well as the patient, there is no known history of prostate cancer, and he is believed to be prostate MRI and biopsy naive. Daughter states patient was also seeing a second urologist, Dr. Dobbins, for several years, records are unavailable for review.   Overall, patient reports satisfaction with the state of his urinations. He takes Flomax 0.4 mg at bedtime. Reports nocturia x 1 on occasion. Denies difficulty urinating/straining or inability to completely empty bladder.      PSA Hx 2.86 - 5/10/2024   Biopsy Hx N/A  MRI Hx N/A   09/09/2024 IPSS 0 QOL 0 ELLEN Did not fill out    PMHx: CKD, dementia, depression, elevated PSA, HTN, HLD  SxHx: Brain surgery w/ clots evacuation, hernia repair  FHx: Colon CA, stomach CA  SocHx: Never smoker or ETOH use  Allergies: NKDA     The patient denies fevers, chills, nausea and or vomiting and no unexplained weight loss. All pertinent laboratory, films and physician notes were reviewed.  Questionnaire results were discussed with patient.

## 2024-09-10 LAB
APPEARANCE: CLEAR
BACTERIA: NEGATIVE /HPF
BILIRUBIN URINE: NEGATIVE
BLOOD URINE: NEGATIVE
CAST: 0 /LPF
COLOR: YELLOW
EPITHELIAL CELLS: 0 /HPF
GLUCOSE QUALITATIVE U: NEGATIVE MG/DL
KETONES URINE: NEGATIVE MG/DL
LEUKOCYTE ESTERASE URINE: NEGATIVE
MICROSCOPIC-UA: NORMAL
NITRITE URINE: NEGATIVE
PH URINE: 6
PROTEIN URINE: NORMAL MG/DL
PSA FREE FLD-MCNC: 33 %
PSA FREE SERPL-MCNC: 1 NG/ML
PSA SERPL-MCNC: 3.03 NG/ML
RED BLOOD CELLS URINE: 1 /HPF
SPECIFIC GRAVITY URINE: 1.01
UROBILINOGEN URINE: 0.2 MG/DL
WHITE BLOOD CELLS URINE: 0 /HPF

## 2024-09-12 LAB — BACTERIA UR CULT: NORMAL

## 2024-10-02 ENCOUNTER — APPOINTMENT (OUTPATIENT)
Dept: UROLOGY | Facility: CLINIC | Age: 89
End: 2024-10-02

## 2024-12-11 DIAGNOSIS — R05.9 COUGH, UNSPECIFIED: ICD-10-CM

## 2024-12-11 RX ORDER — TAMSULOSIN HYDROCHLORIDE 0.4 MG/1
0.4 CAPSULE ORAL
Qty: 30 | Refills: 1 | Status: ACTIVE | COMMUNITY
Start: 2024-12-11 | End: 1900-01-01

## 2024-12-24 ENCOUNTER — RX RENEWAL (OUTPATIENT)
Age: 89
End: 2024-12-24

## 2025-01-22 ENCOUNTER — RX RENEWAL (OUTPATIENT)
Age: 89
End: 2025-01-22

## 2025-01-31 NOTE — PRE-OP CHECKLIST - SKIN PREP
"Virtual Regular Visit  Name: Raudel Bland      : 1989      MRN: [id number]  Encounter Provider: Shannon D Severino, PA-C  Encounter Date: 2025   Encounter department: VIRTUAL CARE       Verification of patient location:  Patient is located at Home in the following state in which I hold an active license [place] :  Assessment & Plan  Impacted third molar tooth    Orders:    clindamycin (CLEOCIN) 300 MG capsule; Take 1 capsule (300 mg total) by mouth 4 (four) times a day for 7 days    Ambulatory Referral to Oral Maxillofacial Surgery; Future        Encounter provider Shannon D Severino, PA-C    The patient was identified by name and date of birth. Raudel Bland was informed that this is a telemedicine visit and that the visit is being conducted through the Epic Embedded platform. He agrees to proceed..  My office door was closed. No one else was in the room.  He acknowledged consent and understanding of privacy and security of the video platform. The patient has agreed to participate and understands they can discontinue the visit at any time.    Patient is aware this is a billable service.     History was obtained from: History obtained from: patient  History of Present Illness     Pt reports swelling around his L lower wisdom tooth x 1.5 weeks. Any time he eats it \"Separates\", swells and becomes painful. Using oral rinse, salt rinse, brushing teeth, ibuprofen, excedrin without relief. Does not have a dentist.       Review of Systems   Constitutional:  Negative for fever.   HENT:  Positive for dental problem. Negative for nosebleeds.    Eyes:  Negative for redness.   Respiratory:  Negative for shortness of breath.    Cardiovascular:  Negative for chest pain.   Gastrointestinal:  Negative for blood in stool.   Genitourinary:  Negative for hematuria.   Musculoskeletal:  Negative for gait problem.   Skin:  Negative for rash.   Neurological:  Negative for seizures.   Psychiatric/Behavioral:  Negative for " behavioral problems.        Objective   There were no vitals taken for this visit.    Physical Exam  Constitutional:       General: He is not in acute distress.     Appearance: Normal appearance. He is not toxic-appearing.   HENT:      Head: Normocephalic and atraumatic.      Nose: No rhinorrhea.      Mouth/Throat:      Mouth: Mucous membranes are moist.     Eyes:      Conjunctiva/sclera: Conjunctivae normal.   Pulmonary:      Effort: Pulmonary effort is normal. No respiratory distress.      Breath sounds: No wheezing (no gross audible wheeze through computer).   Musculoskeletal:      Cervical back: Normal range of motion.   Skin:     Findings: No rash (on face or neck).   Neurological:      Mental Status: He is alert.      Cranial Nerves: No dysarthria or facial asymmetry.   Psychiatric:         Mood and Affect: Mood normal.         Behavior: Behavior normal.         Visit Time  Total Visit Duration: 11 minutes not including the time spent for establishing the audio/video connection.    n/a

## 2025-03-26 DIAGNOSIS — E78.5 HYPERLIPIDEMIA, UNSPECIFIED: ICD-10-CM

## 2025-04-14 ENCOUNTER — APPOINTMENT (OUTPATIENT)
Dept: UROLOGY | Facility: CLINIC | Age: 89
End: 2025-04-14

## 2025-07-14 ENCOUNTER — APPOINTMENT (OUTPATIENT)
Age: 89
End: 2025-07-14

## 2025-07-29 ENCOUNTER — RX RENEWAL (OUTPATIENT)
Age: 89
End: 2025-07-29

## 2025-07-30 ENCOUNTER — APPOINTMENT (OUTPATIENT)
Age: 89
End: 2025-07-30

## 2025-08-18 ENCOUNTER — RX RENEWAL (OUTPATIENT)
Age: 89
End: 2025-08-18

## (undated) DEVICE — SOL IRR BAG NS 0.9% 1000ML

## (undated) DEVICE — DRSG XEROFORM 1 X 8"

## (undated) DEVICE — PACK NEURO MINOR

## (undated) DEVICE — LABELS BLANK W PEN

## (undated) DEVICE — VENODYNE/SCD SLEEVE CALF MEDIUM

## (undated) DEVICE — CANISTER SUCTION LID GUARD 3000CC

## (undated) DEVICE — DURABLE MEDICAL EQUIPMENT: Type: DURABLE MEDICAL EQUIPMENT

## (undated) DEVICE — SUT ETHILON 3-0 18" FS-1

## (undated) DEVICE — MARKING PEN W RULER

## (undated) DEVICE — DRAPE CRANI INCISION 70X110"

## (undated) DEVICE — DRAIN JACKSON PRATT 7MM FLAT FULL NO TROCAR

## (undated) DEVICE — DRAPE TOWEL BLUE 17" X 24"

## (undated) DEVICE — DRSG TAPE HYPAFIX 4"

## (undated) DEVICE — DRSG TELFA 3 X 8

## (undated) DEVICE — GLV 7.5 PROTEXIS (WHITE)

## (undated) DEVICE — BIPOLAR FORCEP STRYKER STANDARD 8" X 1MM (YELLOW)

## (undated) DEVICE — SUT MONOCRYL 5-0 18" P-3 UNDYED

## (undated) DEVICE — POSITIONER STRAP ARMBOARD VELCRO TS-30

## (undated) DEVICE — DRAPE 3/4 SHEET 52X76"

## (undated) DEVICE — PREP BETADINE SPONGE STICKS

## (undated) DEVICE — WARMING BLANKET FULL ADULT

## (undated) DEVICE — ACRA-CUT CRANIAL PERFORATOR ADULT 14MM X 11MM (WHITE)

## (undated) DEVICE — Device

## (undated) DEVICE — STAPLER SKIN VISI-STAT 35 WIDE

## (undated) DEVICE — DRAIN JACKSON PRATT 7FR ROUND END NO TROCAR

## (undated) DEVICE — MEDTRONIC AXIEM PATIENT TRACKER NON-INVASIVE

## (undated) DEVICE — PREP DURAPREP 26CC

## (undated) DEVICE — CANISTER SUCTION 3000ML

## (undated) DEVICE — DRAIN RESERVOIR FOR JACKSON PRATT 100CC CARDINAL

## (undated) DEVICE — SUT VICRYL 3-0 18" SH UNDYED (POP-OFF)

## (undated) DEVICE — SUT VICRYL 4-0 18" RB-1 UNDYED (POP-OFF)